# Patient Record
Sex: FEMALE | Race: WHITE | NOT HISPANIC OR LATINO
[De-identification: names, ages, dates, MRNs, and addresses within clinical notes are randomized per-mention and may not be internally consistent; named-entity substitution may affect disease eponyms.]

---

## 2017-02-08 ENCOUNTER — APPOINTMENT (OUTPATIENT)
Dept: CARDIOLOGY | Facility: CLINIC | Age: 82
End: 2017-02-08

## 2017-02-08 VITALS
BODY MASS INDEX: 25.39 KG/M2 | TEMPERATURE: 97 F | HEIGHT: 66 IN | WEIGHT: 158 LBS | SYSTOLIC BLOOD PRESSURE: 120 MMHG | OXYGEN SATURATION: 98 % | DIASTOLIC BLOOD PRESSURE: 70 MMHG | HEART RATE: 66 BPM | RESPIRATION RATE: 12 BRPM

## 2017-05-31 ENCOUNTER — APPOINTMENT (OUTPATIENT)
Dept: CARDIOLOGY | Facility: CLINIC | Age: 82
End: 2017-05-31

## 2017-05-31 VITALS
DIASTOLIC BLOOD PRESSURE: 78 MMHG | RESPIRATION RATE: 12 BRPM | SYSTOLIC BLOOD PRESSURE: 130 MMHG | TEMPERATURE: 97 F | HEIGHT: 66 IN | OXYGEN SATURATION: 98 % | WEIGHT: 160 LBS | BODY MASS INDEX: 25.71 KG/M2 | HEART RATE: 65 BPM

## 2017-08-09 ENCOUNTER — MEDICATION RENEWAL (OUTPATIENT)
Age: 82
End: 2017-08-09

## 2017-08-09 ENCOUNTER — APPOINTMENT (OUTPATIENT)
Dept: CARDIOLOGY | Facility: CLINIC | Age: 82
End: 2017-08-09
Payer: MEDICARE

## 2017-08-09 VITALS
SYSTOLIC BLOOD PRESSURE: 130 MMHG | HEIGHT: 66 IN | RESPIRATION RATE: 12 BRPM | WEIGHT: 157 LBS | HEART RATE: 90 BPM | BODY MASS INDEX: 25.23 KG/M2 | DIASTOLIC BLOOD PRESSURE: 70 MMHG

## 2017-08-09 PROCEDURE — 93280 PM DEVICE PROGR EVAL DUAL: CPT

## 2017-08-09 PROCEDURE — 99215 OFFICE O/P EST HI 40 MIN: CPT | Mod: 25

## 2017-11-15 ENCOUNTER — APPOINTMENT (OUTPATIENT)
Dept: CARDIOLOGY | Facility: CLINIC | Age: 82
End: 2017-11-15
Payer: MEDICARE

## 2017-11-15 VITALS
HEART RATE: 75 BPM | RESPIRATION RATE: 14 BRPM | BODY MASS INDEX: 25.39 KG/M2 | DIASTOLIC BLOOD PRESSURE: 70 MMHG | SYSTOLIC BLOOD PRESSURE: 140 MMHG | WEIGHT: 158 LBS | HEIGHT: 66 IN

## 2017-11-15 VITALS — DIASTOLIC BLOOD PRESSURE: 70 MMHG | SYSTOLIC BLOOD PRESSURE: 130 MMHG

## 2017-11-15 PROCEDURE — 99215 OFFICE O/P EST HI 40 MIN: CPT | Mod: 25

## 2017-11-15 PROCEDURE — 93000 ELECTROCARDIOGRAM COMPLETE: CPT

## 2018-04-04 ENCOUNTER — APPOINTMENT (OUTPATIENT)
Dept: CARDIOLOGY | Facility: CLINIC | Age: 83
End: 2018-04-04
Payer: MEDICARE

## 2018-04-04 VITALS
WEIGHT: 157 LBS | HEART RATE: 70 BPM | SYSTOLIC BLOOD PRESSURE: 132 MMHG | BODY MASS INDEX: 25.23 KG/M2 | HEIGHT: 66 IN | DIASTOLIC BLOOD PRESSURE: 80 MMHG | RESPIRATION RATE: 14 BRPM

## 2018-04-04 PROCEDURE — 93281 PM DEVICE PROGR EVAL MULTI: CPT

## 2018-04-04 PROCEDURE — 99215 OFFICE O/P EST HI 40 MIN: CPT | Mod: 25

## 2019-05-30 ENCOUNTER — OTHER (OUTPATIENT)
Age: 84
End: 2019-05-30

## 2019-05-30 ENCOUNTER — RX RENEWAL (OUTPATIENT)
Age: 84
End: 2019-05-30

## 2019-05-30 ENCOUNTER — RESULT CHARGE (OUTPATIENT)
Age: 84
End: 2019-05-30

## 2019-06-18 ENCOUNTER — RX RENEWAL (OUTPATIENT)
Age: 84
End: 2019-06-18

## 2019-10-12 ENCOUNTER — INPATIENT (INPATIENT)
Facility: HOSPITAL | Age: 84
LOS: 1 days | Discharge: HOME HEALTH | DRG: 392 | End: 2019-10-14
Attending: HOSPITALIST | Admitting: HOSPITALIST
Payer: MEDICARE

## 2019-10-12 ENCOUNTER — TRANSCRIPTION ENCOUNTER (OUTPATIENT)
Age: 84
End: 2019-10-12

## 2019-10-12 VITALS
DIASTOLIC BLOOD PRESSURE: 64 MMHG | SYSTOLIC BLOOD PRESSURE: 137 MMHG | HEART RATE: 66 BPM | OXYGEN SATURATION: 98 % | RESPIRATION RATE: 17 BRPM

## 2019-10-12 DIAGNOSIS — E11.9 TYPE 2 DIABETES MELLITUS WITHOUT COMPLICATIONS: ICD-10-CM

## 2019-10-12 DIAGNOSIS — R10.13 EPIGASTRIC PAIN: ICD-10-CM

## 2019-10-12 DIAGNOSIS — H40.9 UNSPECIFIED GLAUCOMA: ICD-10-CM

## 2019-10-12 DIAGNOSIS — R07.89 OTHER CHEST PAIN: ICD-10-CM

## 2019-10-12 DIAGNOSIS — I48.91 UNSPECIFIED ATRIAL FIBRILLATION: ICD-10-CM

## 2019-10-12 LAB
ALBUMIN SERPL ELPH-MCNC: 3.9 G/DL — SIGNIFICANT CHANGE UP (ref 3.3–5)
ALBUMIN SERPL ELPH-MCNC: 4.1 G/DL — SIGNIFICANT CHANGE UP (ref 3.3–5)
ALP SERPL-CCNC: 44 U/L — SIGNIFICANT CHANGE UP (ref 40–120)
ALP SERPL-CCNC: 50 U/L — SIGNIFICANT CHANGE UP (ref 40–120)
ALT FLD-CCNC: 10 U/L — SIGNIFICANT CHANGE UP (ref 10–45)
ALT FLD-CCNC: 13 U/L — SIGNIFICANT CHANGE UP (ref 10–45)
ANION GAP SERPL CALC-SCNC: 12 MMOL/L — SIGNIFICANT CHANGE UP (ref 5–17)
ANION GAP SERPL CALC-SCNC: 9 MMOL/L — SIGNIFICANT CHANGE UP (ref 5–17)
APTT BLD: 42.2 SEC — HIGH (ref 27.5–36.3)
APTT BLD: 42.9 SEC — HIGH (ref 27.5–36.3)
AST SERPL-CCNC: 18 U/L — SIGNIFICANT CHANGE UP (ref 10–40)
AST SERPL-CCNC: 21 U/L — SIGNIFICANT CHANGE UP (ref 10–40)
BILIRUB SERPL-MCNC: 0.6 MG/DL — SIGNIFICANT CHANGE UP (ref 0.2–1.2)
BILIRUB SERPL-MCNC: 0.7 MG/DL — SIGNIFICANT CHANGE UP (ref 0.2–1.2)
BUN SERPL-MCNC: 9 MG/DL — SIGNIFICANT CHANGE UP (ref 7–23)
BUN SERPL-MCNC: 9 MG/DL — SIGNIFICANT CHANGE UP (ref 7–23)
CALCIUM SERPL-MCNC: 9 MG/DL — SIGNIFICANT CHANGE UP (ref 8.4–10.5)
CALCIUM SERPL-MCNC: 9.4 MG/DL — SIGNIFICANT CHANGE UP (ref 8.4–10.5)
CHLORIDE SERPL-SCNC: 101 MMOL/L — SIGNIFICANT CHANGE UP (ref 96–108)
CHLORIDE SERPL-SCNC: 101 MMOL/L — SIGNIFICANT CHANGE UP (ref 96–108)
CHOLEST SERPL-MCNC: 153 MG/DL — SIGNIFICANT CHANGE UP (ref 10–199)
CK MB CFR SERPL CALC: 2.9 NG/ML — SIGNIFICANT CHANGE UP (ref 0–6.7)
CK MB CFR SERPL CALC: 3.4 NG/ML — SIGNIFICANT CHANGE UP (ref 0–6.7)
CK SERPL-CCNC: 68 U/L — SIGNIFICANT CHANGE UP (ref 25–170)
CK SERPL-CCNC: 77 U/L — SIGNIFICANT CHANGE UP (ref 25–170)
CO2 SERPL-SCNC: 26 MMOL/L — SIGNIFICANT CHANGE UP (ref 22–31)
CO2 SERPL-SCNC: 27 MMOL/L — SIGNIFICANT CHANGE UP (ref 22–31)
CREAT SERPL-MCNC: 0.43 MG/DL — LOW (ref 0.5–1.3)
CREAT SERPL-MCNC: 0.47 MG/DL — LOW (ref 0.5–1.3)
GLUCOSE SERPL-MCNC: 144 MG/DL — HIGH (ref 70–99)
GLUCOSE SERPL-MCNC: 167 MG/DL — HIGH (ref 70–99)
HBA1C BLD-MCNC: 6.3 % — HIGH (ref 4–5.6)
HCT VFR BLD CALC: 37 % — SIGNIFICANT CHANGE UP (ref 34.5–45)
HCT VFR BLD CALC: 39.7 % — SIGNIFICANT CHANGE UP (ref 34.5–45)
HDLC SERPL-MCNC: 81 MG/DL — SIGNIFICANT CHANGE UP
HGB BLD-MCNC: 12.3 G/DL — SIGNIFICANT CHANGE UP (ref 11.5–15.5)
HGB BLD-MCNC: 13 G/DL — SIGNIFICANT CHANGE UP (ref 11.5–15.5)
INR BLD: 3.94 — HIGH (ref 0.88–1.16)
INR BLD: 4.11 — HIGH (ref 0.88–1.16)
LIPID PNL WITH DIRECT LDL SERPL: 61 MG/DL — SIGNIFICANT CHANGE UP
MAGNESIUM SERPL-MCNC: 1.8 MG/DL — SIGNIFICANT CHANGE UP (ref 1.6–2.6)
MCHC RBC-ENTMCNC: 31 PG — SIGNIFICANT CHANGE UP (ref 27–34)
MCHC RBC-ENTMCNC: 31.3 PG — SIGNIFICANT CHANGE UP (ref 27–34)
MCHC RBC-ENTMCNC: 32.7 GM/DL — SIGNIFICANT CHANGE UP (ref 32–36)
MCHC RBC-ENTMCNC: 33.2 GM/DL — SIGNIFICANT CHANGE UP (ref 32–36)
MCV RBC AUTO: 94.1 FL — SIGNIFICANT CHANGE UP (ref 80–100)
MCV RBC AUTO: 94.7 FL — SIGNIFICANT CHANGE UP (ref 80–100)
NRBC # BLD: 0 /100 WBCS — SIGNIFICANT CHANGE UP (ref 0–0)
NRBC # BLD: 0 /100 WBCS — SIGNIFICANT CHANGE UP (ref 0–0)
PHOSPHATE SERPL-MCNC: 2.9 MG/DL — SIGNIFICANT CHANGE UP (ref 2.5–4.5)
PLATELET # BLD AUTO: 159 K/UL — SIGNIFICANT CHANGE UP (ref 150–400)
PLATELET # BLD AUTO: 165 K/UL — SIGNIFICANT CHANGE UP (ref 150–400)
POTASSIUM SERPL-MCNC: 3.8 MMOL/L — SIGNIFICANT CHANGE UP (ref 3.5–5.3)
POTASSIUM SERPL-MCNC: 4.3 MMOL/L — SIGNIFICANT CHANGE UP (ref 3.5–5.3)
POTASSIUM SERPL-SCNC: 3.8 MMOL/L — SIGNIFICANT CHANGE UP (ref 3.5–5.3)
POTASSIUM SERPL-SCNC: 4.3 MMOL/L — SIGNIFICANT CHANGE UP (ref 3.5–5.3)
PROT SERPL-MCNC: 6.5 G/DL — SIGNIFICANT CHANGE UP (ref 6–8.3)
PROT SERPL-MCNC: 7.3 G/DL — SIGNIFICANT CHANGE UP (ref 6–8.3)
PROTHROM AB SERPL-ACNC: 46.5 SEC — HIGH (ref 10–12.9)
PROTHROM AB SERPL-ACNC: 48.5 SEC — HIGH (ref 10–12.9)
RAPID RVP RESULT: SIGNIFICANT CHANGE UP
RBC # BLD: 3.93 M/UL — SIGNIFICANT CHANGE UP (ref 3.8–5.2)
RBC # BLD: 4.19 M/UL — SIGNIFICANT CHANGE UP (ref 3.8–5.2)
RBC # FLD: 12 % — SIGNIFICANT CHANGE UP (ref 10.3–14.5)
RBC # FLD: 12.1 % — SIGNIFICANT CHANGE UP (ref 10.3–14.5)
SODIUM SERPL-SCNC: 137 MMOL/L — SIGNIFICANT CHANGE UP (ref 135–145)
SODIUM SERPL-SCNC: 139 MMOL/L — SIGNIFICANT CHANGE UP (ref 135–145)
TOTAL CHOLESTEROL/HDL RATIO MEASUREMENT: 1.9 RATIO — LOW (ref 3.3–7.1)
TRIGL SERPL-MCNC: 56 MG/DL — SIGNIFICANT CHANGE UP (ref 10–149)
TROPONIN T SERPL-MCNC: <0.01 NG/ML — SIGNIFICANT CHANGE UP (ref 0–0.01)
TROPONIN T SERPL-MCNC: <0.01 NG/ML — SIGNIFICANT CHANGE UP (ref 0–0.01)
WBC # BLD: 6.88 K/UL — SIGNIFICANT CHANGE UP (ref 3.8–10.5)
WBC # BLD: 7.5 K/UL — SIGNIFICANT CHANGE UP (ref 3.8–10.5)
WBC # FLD AUTO: 6.88 K/UL — SIGNIFICANT CHANGE UP (ref 3.8–10.5)
WBC # FLD AUTO: 7.5 K/UL — SIGNIFICANT CHANGE UP (ref 3.8–10.5)

## 2019-10-12 PROCEDURE — 99222 1ST HOSP IP/OBS MODERATE 55: CPT

## 2019-10-12 PROCEDURE — 93308 TTE F-UP OR LMTD: CPT | Mod: 26,59

## 2019-10-12 PROCEDURE — 99291 CRITICAL CARE FIRST HOUR: CPT

## 2019-10-12 PROCEDURE — 93010 ELECTROCARDIOGRAM REPORT: CPT

## 2019-10-12 PROCEDURE — 93306 TTE W/DOPPLER COMPLETE: CPT | Mod: 26

## 2019-10-12 PROCEDURE — 93010 ELECTROCARDIOGRAM REPORT: CPT | Mod: 77

## 2019-10-12 PROCEDURE — 71045 X-RAY EXAM CHEST 1 VIEW: CPT | Mod: 26

## 2019-10-12 RX ORDER — POTASSIUM CHLORIDE 20 MEQ
20 PACKET (EA) ORAL ONCE
Refills: 0 | Status: COMPLETED | OUTPATIENT
Start: 2019-10-12 | End: 2019-10-12

## 2019-10-12 RX ORDER — PANTOPRAZOLE SODIUM 20 MG/1
40 TABLET, DELAYED RELEASE ORAL
Refills: 0 | Status: DISCONTINUED | OUTPATIENT
Start: 2019-10-12 | End: 2019-10-14

## 2019-10-12 RX ORDER — ENOXAPARIN SODIUM 100 MG/ML
40 INJECTION SUBCUTANEOUS EVERY 24 HOURS
Refills: 0 | Status: DISCONTINUED | OUTPATIENT
Start: 2019-10-12 | End: 2019-10-12

## 2019-10-12 RX ORDER — INSULIN LISPRO 100/ML
VIAL (ML) SUBCUTANEOUS
Refills: 0 | Status: DISCONTINUED | OUTPATIENT
Start: 2019-10-12 | End: 2019-10-14

## 2019-10-12 RX ORDER — DEXTROSE 50 % IN WATER 50 %
12.5 SYRINGE (ML) INTRAVENOUS ONCE
Refills: 0 | Status: DISCONTINUED | OUTPATIENT
Start: 2019-10-12 | End: 2019-10-14

## 2019-10-12 RX ORDER — DEXTROSE 50 % IN WATER 50 %
25 SYRINGE (ML) INTRAVENOUS ONCE
Refills: 0 | Status: DISCONTINUED | OUTPATIENT
Start: 2019-10-12 | End: 2019-10-14

## 2019-10-12 RX ORDER — MAGNESIUM SULFATE 500 MG/ML
1 VIAL (ML) INJECTION ONCE
Refills: 0 | Status: COMPLETED | OUTPATIENT
Start: 2019-10-12 | End: 2019-10-12

## 2019-10-12 RX ORDER — INFLUENZA VIRUS VACCINE 15; 15; 15; 15 UG/.5ML; UG/.5ML; UG/.5ML; UG/.5ML
0.5 SUSPENSION INTRAMUSCULAR ONCE
Refills: 0 | Status: DISCONTINUED | OUTPATIENT
Start: 2019-10-12 | End: 2019-10-14

## 2019-10-12 RX ORDER — SODIUM CHLORIDE 9 MG/ML
1000 INJECTION, SOLUTION INTRAVENOUS
Refills: 0 | Status: DISCONTINUED | OUTPATIENT
Start: 2019-10-12 | End: 2019-10-14

## 2019-10-12 RX ORDER — GLUCAGON INJECTION, SOLUTION 0.5 MG/.1ML
1 INJECTION, SOLUTION SUBCUTANEOUS ONCE
Refills: 0 | Status: DISCONTINUED | OUTPATIENT
Start: 2019-10-12 | End: 2019-10-14

## 2019-10-12 RX ORDER — DIGOXIN 250 MCG
0.12 TABLET ORAL EVERY 24 HOURS
Refills: 0 | Status: DISCONTINUED | OUTPATIENT
Start: 2019-10-12 | End: 2019-10-14

## 2019-10-12 RX ORDER — DILTIAZEM HCL 120 MG
180 CAPSULE, EXT RELEASE 24 HR ORAL EVERY 24 HOURS
Refills: 0 | Status: DISCONTINUED | OUTPATIENT
Start: 2019-10-12 | End: 2019-10-14

## 2019-10-12 RX ORDER — DORZOLAMIDE HYDROCHLORIDE 20 MG/ML
1 SOLUTION/ DROPS OPHTHALMIC
Refills: 0 | Status: DISCONTINUED | OUTPATIENT
Start: 2019-10-12 | End: 2019-10-14

## 2019-10-12 RX ORDER — ACETAMINOPHEN 500 MG
650 TABLET ORAL ONCE
Refills: 0 | Status: COMPLETED | OUTPATIENT
Start: 2019-10-12 | End: 2019-10-12

## 2019-10-12 RX ORDER — CHLORHEXIDINE GLUCONATE 213 G/1000ML
1 SOLUTION TOPICAL
Refills: 0 | Status: DISCONTINUED | OUTPATIENT
Start: 2019-10-12 | End: 2019-10-12

## 2019-10-12 RX ORDER — DEXTROSE 50 % IN WATER 50 %
15 SYRINGE (ML) INTRAVENOUS ONCE
Refills: 0 | Status: DISCONTINUED | OUTPATIENT
Start: 2019-10-12 | End: 2019-10-14

## 2019-10-12 RX ADMIN — PANTOPRAZOLE SODIUM 40 MILLIGRAM(S): 20 TABLET, DELAYED RELEASE ORAL at 12:35

## 2019-10-12 RX ADMIN — ENOXAPARIN SODIUM 40 MILLIGRAM(S): 100 INJECTION SUBCUTANEOUS at 06:51

## 2019-10-12 RX ADMIN — Medication 20 MILLIEQUIVALENT(S): at 05:35

## 2019-10-12 RX ADMIN — DORZOLAMIDE HYDROCHLORIDE 1 DROP(S): 20 SOLUTION/ DROPS OPHTHALMIC at 20:28

## 2019-10-12 RX ADMIN — Medication 100 GRAM(S): at 04:30

## 2019-10-12 RX ADMIN — Medication 0.12 MILLIGRAM(S): at 06:51

## 2019-10-12 RX ADMIN — CHLORHEXIDINE GLUCONATE 1 APPLICATION(S): 213 SOLUTION TOPICAL at 06:48

## 2019-10-12 RX ADMIN — Medication 180 MILLIGRAM(S): at 07:03

## 2019-10-12 NOTE — DISCHARGE NOTE PROVIDER - NSDCFUADDAPPT_GEN_ALL_CORE_FT
Please follow up with  Please follow up with Dr. Tavarez in 1 week. Please call her office and make an appointment to see her. It is very important that you follow up with Dr. Tavarez in 1week to have your INR checked  Please follow up with your Primary care provider in 1-2 week.

## 2019-10-12 NOTE — H&P ADULT - NSHPLABSRESULTS_GEN_ALL_CORE
LABS:                        13.0   7.50  )-----------( 165      ( 12 Oct 2019 01:53 )             39.7     10-12    139  |  101  |  9   ----------------------------<  167<H>  3.8   |  26  |  0.43<L>    Ca    9.4      12 Oct 2019 01:53  Phos  2.9     10-12  Mg     1.8     10-12    TPro  7.3  /  Alb  4.1  /  TBili  0.7  /  DBili  x   /  AST  21  /  ALT  13  /  AlkPhos  50  10-12

## 2019-10-12 NOTE — PROGRESS NOTE ADULT - PROBLEM SELECTOR PLAN 2
epigastric pain assoc w/NBNB vomiting and diarrhea that has now resolved.   - remains afebrile w/ no leukocytosis.  RVP negative, CXR clear.   - Abd exam benign  - simethicone prn for bloating

## 2019-10-12 NOTE — DISCHARGE NOTE PROVIDER - PROVIDER TOKENS
FREE:[LAST:[[facility]?],PHONE:[(   )    -],FAX:[(   )    -]] FREE:[LAST:[Dr. whitley],FIRST:[alma],PHONE:[phone]],FAX:[(   )    -],ADDRESS:[[place] [address]]]

## 2019-10-12 NOTE — PROGRESS NOTE ADULT - SUBJECTIVE AND OBJECTIVE BOX
Interventional Cardiology PA Adult Progress Note/CCU STEPDOWN NOTE    Hospital Course:     86-year-old female  history of spinal stenosis, hyperlipidemia, [facility] PPM, chronic afib (on coumadin), diet controlled diabetes mellitus who is transferred to [facility] from OSH 10/12 for epigastric pain and discomfort; nonradiating epigastric 9/10 pain assoc with NBNB vomiting x4, diarrhea x2; admitted to CCU for monitoring and workup. Symptoms resolved once in the CCU.  Trops negative x2, EKG not concerning for acute pathology, no longer having abd pain.  For infectious workup - WBC wnl and patient afebrile while in CCU with no urinary symptoms. AM CXR clear and RVP negative. Pt endorsed some dizziness but was orthostatics negative.  Patient is pending a PPM interrogation.  While in the CCU patient remained hemodynamically stable and is up for transfer to [facility] to 5U for any further work up and monitoring.    Subjective Assessment:  	  MEDICATIONS:  digoxin     Tablet 0.125 milliGRAM(s) Oral every 24 hours  diltiazem    milliGRAM(s) Oral every 24 hours  pantoprazole    Tablet 40 milliGRAM(s) Oral before breakfast  insulin lispro (HumaLOG) corrective regimen sliding scale   SubCutaneous three times a day before meals  dextrose 5%. 1000 milliLiter(s) IV Continuous <Continuous>  dorzolamide 2% Ophthalmic Solution 1 Drop(s) Right EYE <User Schedule>  influenza   Vaccine 0.5 milliLiter(s) IntraMuscular once	    [PHYSICAL EXAM:  TELEMETRY:  T(C): 36.9 (10-12-19 @ 20:23), Max: 36.9 (10-12-19 @ 06:36)  HR: 64 (10-12-19 @ 20:00) (62 - 74)  BP: 112/72 (10-12-19 @ 20:00) (100/64 - 157/72)  RR: 26 (10-12-19 @ 20:00) (14 - 36)  SpO2: 99% (10-12-19 @ 20:00) (97% - 100%)  Wt(kg): --  I&O's Summary    11 Oct 2019 07:01  -  12 Oct 2019 07:00  --------------------------------------------------------  IN: 0 mL / OUT: 150 mL / NET: -150 mL    12 Oct 2019 07:01  -  12 Oct 2019 20:56  --------------------------------------------------------  IN: 600 mL / OUT: 800 mL / NET: -200 mL      Height (cm): 167.6 (10-12 @ 03:06)  Weight (kg): 119.5 (10-12 @ 03:02)  BMI (kg/m2): 42.5 (10-12 @ 03:06)  BSA (m2): 2.25 (10-12 @ 03:06)                                      Appearance: Normal	  HEENT:   Normal oral mucosa, PERRL, EOMI	  Neck: Supple, + JVD/ - JVD; Carotid Bruit   Cardiovascular: Normal S1 S2, No JVD, No murmurs,   Respiratory: Lungs clear to auscultation/Decreased Breath Sounds/No Rales, Rhonchi, Wheezing	  Gastrointestinal:  Soft, Non-tender, + BS	  Skin: No rashes, No ecchymoses, No cyanosis  Extremities: Normal range of motion, No clubbing, cyanosis or edema  Vascular: Peripheral pulses palpable 2+ bilaterally  Neurologic: Non-focal  Psychiatry: A & O x 3, Mood & affect appropriate   	    LABS:	 	  CARDIAC MARKERS                          12.3   6.88  )-----------( 159      ( 12 Oct 2019 06:14 )             37.0     10-12    137  |  101  |  9   ----------------------------<  144<H>  4.3   |  27  |  0.47<L>    Ca    9.0      12 Oct 2019 06:14  Phos  2.9     10-12  Mg     1.8     10-12    TPro  6.5  /  Alb  3.9  /  TBili  0.6  /  DBili  x   /  AST  18  /  ALT  10  /  AlkPhos  44  10-12    proBNP:   Lipid Profile:   HgA1c: Hemoglobin A1C, Whole Blood: 6.3 % (10-12 @ 06:14)    TSH: Thyroid Stimulating Hormone, Serum: 0.555 uIU/mL (10-12 @ 01:53)    PT/INR - ( 12 Oct 2019 06:14 )   PT: 46.5 sec;   INR: 3.94          PTT - ( 12 Oct 2019 06:14 )  PTT:42.9 sec    ASSESSMENT/PLAN: 	        DVT ppx:  Dispo: Interventional Cardiology PA Adult Progress Note/CCU STEPDOWN NOTE    Hospital Course:     86-year-old female  history of spinal stenosis, hyperlipidemia, [facility] PPM, chronic afib (on coumadin), diet controlled diabetes mellitus who is transferred to [facility] from [facility] 10/12 for epigastric pain and discomfort; nonradiating epigastric 9/10 pain assoc with NBNB vomiting x4, diarrhea x2; admitted to CCU for monitoring and workup. Symptoms resolved once in the CCU.  Trops negative x2, EKG not concerning for acute pathology, no longer having abd pain.  For infectious workup - WBC wnl and patient afebrile while in CCU with no urinary symptoms. AM CXR clear and RVP negative. Pt endorsed some dizziness but was orthostatics negative.  INR supratherapeutic and holding home coumadin. Patient is pending a PPM interrogation.  While in the CCU patient remained hemodynamically stable and is up for transfer to [facility] floor to 5U for any further work up and monitoring.    Subjective Assessment:  	  MEDICATIONS:  digoxin     Tablet 0.125 milliGRAM(s) Oral every 24 hours  diltiazem    milliGRAM(s) Oral every 24 hours  pantoprazole    Tablet 40 milliGRAM(s) Oral before breakfast  insulin lispro (HumaLOG) corrective regimen sliding scale   SubCutaneous three times a day before meals  dextrose 5%. 1000 milliLiter(s) IV Continuous <Continuous>  dorzolamide 2% Ophthalmic Solution 1 Drop(s) Right EYE <User Schedule>  influenza   Vaccine 0.5 milliLiter(s) IntraMuscular once	    [PHYSICAL EXAM:  TELEMETRY:  T(C): 36.9 (10-12-19 @ 20:23), Max: 36.9 (10-12-19 @ 06:36)  HR: 64 (10-12-19 @ 20:00) (62 - 74)  BP: 112/72 (10-12-19 @ 20:00) (100/64 - 157/72)  RR: 26 (10-12-19 @ 20:00) (14 - 36)  SpO2: 99% (10-12-19 @ 20:00) (97% - 100%)  Wt(kg): --  I&O's Summary    11 Oct 2019 07:01  -  12 Oct 2019 07:00  --------------------------------------------------------  IN: 0 mL / OUT: 150 mL / NET: -150 mL    12 Oct 2019 07:01  -  12 Oct 2019 20:56  --------------------------------------------------------  IN: 600 mL / OUT: 800 mL / NET: -200 mL      Height (cm): 167.6 (10-12 @ 03:06)  Weight (kg): 119.5 (10-12 @ 03:02)  BMI (kg/m2): 42.5 (10-12 @ 03:06)  BSA (m2): 2.25 (10-12 @ 03:06)                                      Appearance: Normal	  HEENT:   Normal oral mucosa, PERRL, EOMI	  Neck: Supple, + JVD/ - JVD; Carotid Bruit   Cardiovascular: Normal S1 S2, No JVD, No murmurs,   Respiratory: Lungs clear to auscultation/Decreased Breath Sounds/No Rales, Rhonchi, Wheezing	  Gastrointestinal:  Soft, Non-tender, + BS	  Skin: No rashes, No ecchymoses, No cyanosis  Extremities: Normal range of motion, No clubbing, cyanosis or edema  Vascular: Peripheral pulses palpable 2+ bilaterally  Neurologic: Non-focal  Psychiatry: A & O x 3, Mood & affect appropriate   	    LABS:	 	  CARDIAC MARKERS                          12.3   6.88  )-----------( 159      ( 12 Oct 2019 06:14 )             37.0     10-12    137  |  101  |  9   ----------------------------<  144<H>  4.3   |  27  |  0.47<L>    Ca    9.0      12 Oct 2019 06:14  Phos  2.9     10-12  Mg     1.8     10-12    TPro  6.5  /  Alb  3.9  /  TBili  0.6  /  DBili  x   /  AST  18  /  ALT  10  /  AlkPhos  44  10-12    proBNP:   Lipid Profile:   HgA1c: Hemoglobin A1C, Whole Blood: 6.3 % (10-12 @ 06:14)    TSH: Thyroid Stimulating Hormone, Serum: 0.555 uIU/mL (10-12 @ 01:53)    PT/INR - ( 12 Oct 2019 06:14 )   PT: 46.5 sec;   INR: 3.94          PTT - ( 12 Oct 2019 06:14 )  PTT:42.9 sec    ASSESSMENT/PLAN: 	        DVT ppx:  Dispo: Interventional Cardiology PA Adult Progress Note/CCU STEPDOWN NOTE    Hospital Course:     86-year-old female  history of spinal stenosis, hyperlipidemia, [facility] PPM, chronic afib (on coumadin), diet controlled diabetes mellitus who is transferred to [facility] from [facility] 10/12 for epigastric pain and discomfort; nonradiating epigastric 9/10 pain assoc with NBNB vomiting x4, diarrhea x2; admitted to CCU for monitoring and workup. Symptoms resolved once in the CCU.  Trops negative x2, EKG not concerning for acute pathology, no longer having abd pain.  For infectious workup - WBC wnl and patient afebrile while in CCU with no urinary symptoms. AM CXR clear and RVP negative. Pt endorsed some dizziness but was orthostatics negative.  INR supratherapeutic and holding home coumadin. Patient is pending a PPM interrogation.  While in the CCU patient remained hemodynamically stable and is up for transfer to tele floor to 5U for any further work up and monitoring.    Subjective Assessment: Patient see and examined at bedside, she is comfortable now, states her abdominal pain has resolved and she will see how she feels with food  ROS negtive except per HPI and subjective  	  MEDICATIONS:  digoxin     Tablet 0.125 milliGRAM(s) Oral every 24 hours  diltiazem    milliGRAM(s) Oral every 24 hours  pantoprazole    Tablet 40 milliGRAM(s) Oral before breakfast  insulin lispro (HumaLOG) corrective regimen sliding scale   SubCutaneous three times a day before meals  dextrose 5%. 1000 milliLiter(s) IV Continuous <Continuous>  dorzolamide 2% Ophthalmic Solution 1 Drop(s) Right EYE <User Schedule>  influenza   Vaccine 0.5 milliLiter(s) IntraMuscular once	    [PHYSICAL EXAM:  TELEMETRY:  T(C): 36.9 (10-12-19 @ 20:23), Max: 36.9 (10-12-19 @ 06:36)  HR: 64 (10-12-19 @ 20:00) (62 - 74)  BP: 112/72 (10-12-19 @ 20:00) (100/64 - 157/72)  RR: 26 (10-12-19 @ 20:00) (14 - 36)  SpO2: 99% (10-12-19 @ 20:00) (97% - 100%)  Wt(kg): --  I&O's Summary    11 Oct 2019 07:01  -  12 Oct 2019 07:00  --------------------------------------------------------  IN: 0 mL / OUT: 150 mL / NET: -150 mL    12 Oct 2019 07:01  -  12 Oct 2019 20:56  --------------------------------------------------------  IN: 600 mL / OUT: 800 mL / NET: -200 mL      Height (cm): 167.6 (10-12 @ 03:06)  Weight (kg): 119.5 (10-12 @ 03:02)  BMI (kg/m2): 42.5 (10-12 @ 03:06)  BSA (m2): 2.25 (10-12 @ 03:06)                                      Appearance: Normal	  HEENT:   Normal oral mucosa, PERRL, EOMI	  Neck: Supple,  - JVD; Carotid Bruit   Cardiovascular: Normal S1 S2, No JVD, No murmurs,   Respiratory: Lungs clear to auscultation//No Rales, Rhonchi, Wheezing	  Gastrointestinal:  Soft, Non-tender, + BS	  Skin: No rashes, No ecchymoses, No cyanosis  Extremities: Normal range of motion, No clubbing, cyanosis or edema  Vascular: Peripheral pulses palpable 2+ bilaterally  Neurologic: Non-focal  Psychiatry: A & O x 3, Mood & affect appropriate   	    LABS:	 	  CARDIAC MARKERS                          12.3   6.88  )-----------( 159      ( 12 Oct 2019 06:14 )             37.0     10-12    137  |  101  |  9   ----------------------------<  144<H>  4.3   |  27  |  0.47<L>    Ca    9.0      12 Oct 2019 06:14  Phos  2.9     10-12  Mg     1.8     10-12    TPro  6.5  /  Alb  3.9  /  TBili  0.6  /  DBili  x   /  AST  18  /  ALT  10  /  AlkPhos  44  10-12    proBNP:   Lipid Profile:   HgA1c: Hemoglobin A1C, Whole Blood: 6.3 % (10-12 @ 06:14)    TSH: Thyroid Stimulating Hormone, Serum: 0.555 uIU/mL (10-12 @ 01:53)    PT/INR - ( 12 Oct 2019 06:14 )   PT: 46.5 sec;   INR: 3.94          PTT - ( 12 Oct 2019 06:14 )  PTT:42.9 sec    ASSESSMENT/PLAN: 	        DVT ppx:  Dispo:

## 2019-10-12 NOTE — DISCHARGE NOTE PROVIDER - NSDCCPCAREPLAN_GEN_ALL_CORE_FT
PRINCIPAL DISCHARGE DIAGNOSIS  Diagnosis: Gastroenteritis  Assessment and Plan of Treatment: Your abdominal pain, vomiting, and diarrhea were most likely due to gastroenteritis. This is a transient infection of your digestive tract, usually due to a virus or food poisoning. This condition usually resolves with 48 hours. While you were in the hospital, your blood pressures and temperatures were monitored. It is important to stay hydrated as you leave the hospital. Dehydration is the likely cause of your dizziness. If your abdominal pain worsens or you become febrile, it is important to return to the emergency department for evaluation.      SECONDARY DISCHARGE DIAGNOSES  Diagnosis: Diabetes mellitus  Assessment and Plan of Treatment: You have a diagnosis of type 2 diabetes (sometimes called type 2 "diabetes mellitus"). This is a disorder that disrupts the way your body uses sugar. All the cells in your body need sugar to work normally. Sugar gets into the cells with the help of a hormone called insulin. If there is not enough insulin, or if the body stops responding to insulin, sugar builds up in the blood. That is what happens to people with diabetes. Type 2 diabetes usually causes no symptoms. When symptoms do occur, they include the need to urinate often, intense thirst and blurry vision. Even though type 2 diabetes might not make you feel sick, it can cause serious problems over time, if it is not treated. The disorder can lead to heart attacks, strokes, kidney disease, vision problems (or even blindness), pain or loss of feeling in the hands and feet, and the need to have fingers, toes, or other body parts removed (amputated). In addition to maintaining an active lifestyle, losing weight, eating right, and not smoking it is important to take your diabetes medications as directed to control your blood sugar and prevent the possible complications from this disease.  It is important to follow up with your primary care provider regarding your A1C levels. Continue to eat low carb foods without added sugar and avoid sugary drinks, such as fruit juices and soft drinks.       Diagnosis: Afib  Assessment and Plan of Treatment: During this hospital admission you were found to be in Atrial Fibrillation. This is an irregular, often rapid heart rate that commonly causes poor blood flow. The heart's upper chambers (atria) beat out of coordination with the lower chambers (ventricles). This condition may have no symptoms, but when symptoms do appear they include palpitations, shortness of breath, and fatigue. Treatments include drugs, electrical shock (cardioversion), and minimally invasive surgery (ablation). Continue to take your blood thinner and rate controlling medications as directed. Please continue to take your medications as prescribed and follow up with your primary care doctor and cardiologist in 10-14 days.  You take a medication called warfarin for you afib. It is important to monitor your INR levels with your cardiologist. While you were in the hospital, your INR was higher than the therapeutic range for your condition. Your warfarin dose was held on 10/12. Please plan a follow up appointment for this condition.       Diagnosis: HLD (hyperlipidemia)  Assessment and Plan of Treatment: You have high cholesterol. Cholesterol is a substance that is found in the blood. Everyone has some. It is needed for good health. The problem is, people sometimes have too much cholesterol. Compared with people with normal cholesterol, people with high cholesterol have a higher risk of heart attacks, strokes, and other health problems. The higher your cholesterol, the higher your risk of these problems. It is important to take your medications for high cholesterol as prescribed to prevent the complications of this condition. PRINCIPAL DISCHARGE DIAGNOSIS  Diagnosis: Gastroenteritis  Assessment and Plan of Treatment: Your abdominal pain, vomiting, and diarrhea were most likely due to gastroenteritis. This is a transient infection of your digestive tract, usually due to a virus or food poisoning. We did a CT scan of your abdomen which revealed non-obstructing cholelithiasis not requiring any intervention. Please follow up with your primary care provider regularly.   If your abdominal pain worsens or you become febrile, it is important to return to the emergency department for evaluation.      SECONDARY DISCHARGE DIAGNOSES  Diagnosis: Tricuspid regurgitation  Assessment and Plan of Treatment: You had a ultrasound of your heart which revealed severe Tricuspid regurgitation    Diagnosis: BPPV (benign paroxysmal positional vertigo)  Assessment and Plan of Treatment: Your dizziness is most likely caused by BPPV which causes short episodes of intense dizziness (vertigo) when you move your head in certain directions. Vertigo is the sensation that you or your surroundings are moving. Benign paroxysmal positional vertigo is thought to be caused by tiny solid fragments (otoconia) in the inner ear labyrinth. In many cases the condition gets better on its own after several weeks. A simple treatment of moving the head into various positions over a few minutes can cure the condition in many cases. This treatment uses gravity to move the tiny fragments away from where they are causing problems.    Diagnosis: Diabetes mellitus  Assessment and Plan of Treatment: You have a diagnosis of type 2 diabetes (sometimes called type 2 "diabetes mellitus"). This is a disorder that disrupts the way your body uses sugar. All the cells in your body need sugar to work normally. Sugar gets into the cells with the help of a hormone called insulin. If there is not enough insulin, or if the body stops responding to insulin, sugar builds up in the blood. That is what happens to people with diabetes. Type 2 diabetes usually causes no symptoms. When symptoms do occur, they include the need to urinate often, intense thirst and blurry vision. Even though type 2 diabetes might not make you feel sick, it can cause serious problems over time, if it is not treated. The disorder can lead to heart attacks, strokes, kidney disease, vision problems (or even blindness), pain or loss of feeling in the hands and feet, and the need to have fingers, toes, or other body parts removed (amputated). In addition to maintaining an active lifestyle, losing weight, eating right, and not smoking it is important to take your diabetes medications as directed to control your blood sugar and prevent the possible complications from this disease.  It is important to follow up with your primary care provider regarding your A1C levels. Continue to eat low carb foods without added sugar and avoid sugary drinks, such as fruit juices and soft drinks.       Diagnosis: Afib  Assessment and Plan of Treatment: You have a jistory of Atrial Fibrillation. This is an irregular, often rapid heart rate that commonly causes poor blood flow. The heart's upper chambers (atria) beat out of coordination with the lower chambers (ventricles). This condition may have no symptoms, but when symptoms do appear they include palpitations, shortness of breath, and fatigue. Treatments include drugs, electrical shock (cardioversion), and minimally invasive surgery (ablation). Continue to take your blood thinner Coumadin daily . We have stopped your digoxin and started you on medication Toprol 25 mg daily for rate control; please also continue to take rate controlling agent Diltiazem 180 mg daily and follow up with your primary care doctor and cardiologist in 1-2 weeks.   You take a medication called coumadin  for you afib. It is important to monitor your INR levels with your cardiologist. While you were in the hospital, your INR was higher than the therapeutic range for your condition. YourINR is 1.9 today; please continue to take your warfarin and have routine INR checked with your PCP/Cardiologist. PRINCIPAL DISCHARGE DIAGNOSIS  Diagnosis: Gastroenteritis  Assessment and Plan of Treatment: Your abdominal pain, vomiting, and diarrhea were most likely due to gastroenteritis. This is a transient infection of your digestive tract, usually due to a virus or food poisoning. We did a CT scan of your abdomen which revealed non-obstructing cholelithiasis not requiring any intervention. Please follow up with your primary care provider regularly.   If your abdominal pain worsens or you become febrile, it is important to   return to the emergency department for evaluation.      SECONDARY DISCHARGE DIAGNOSES  Diagnosis: Tricuspid regurgitation  Assessment and Plan of Treatment: You had a ultrasound of your heart which revealed severe Tricuspid regurgitation which means that one of the valve inyour heart is leaky; please follow up with Dr. Whitley in 1 week for follw up.  If you have any severe Chest pain; shortness of breath; dizziness; nausea, vomitting; presyncope and syncopal events; please go to the nearest emergency room.    Diagnosis: BPPV (benign paroxysmal positional vertigo)  Assessment and Plan of Treatment: Your dizziness is most likely caused by BPPV which causes short episodes of intense dizziness (vertigo) when you move your head in certain directions. Vertigo is the sensation that you or your surroundings are moving. Benign paroxysmal positional vertigo is thought to be caused by tiny solid fragments (otoconia) in the inner ear labyrinth. In many cases the condition gets better on its own after several weeks. A simple treatment of moving the head into various positions over a few minutes can cure the condition in many cases. This treatment uses gravity to move the tiny fragments away from where they are causing problems.    Diagnosis: Diabetes mellitus  Assessment and Plan of Treatment: You have a diagnosis of type 2 diabetes (sometimes called type 2 "diabetes mellitus"). This is a disorder that disrupts the way your body uses sugar. All the cells in your body need sugar to work normally. Sugar gets into the cells with the help of a hormone called insulin. If there is not enough insulin, or if the body stops responding to insulin, sugar builds up in the blood. That is what happens to people with diabetes. Type 2 diabetes usually causes no symptoms. When symptoms do occur, they include the need to urinate often, intense thirst and blurry vision. Even though type 2 diabetes might not make you feel sick, it can cause serious problems over time, if it is not treated. The disorder can lead to heart attacks, strokes, kidney disease, vision problems (or even blindness), pain or loss of feeling in the hands and feet, and the need to have fingers, toes, or other body parts removed (amputated). In addition to maintaining an active lifestyle, losing weight, eating right, and not smoking it is important to take your diabetes medications as directed to control your blood sugar and prevent the possible complications from this disease.  It is important to follow up with your primary care provider regarding your A1C levels. Continue to eat low carb foods without added sugar and avoid sugary drinks, such as fruit juices and soft drinks.       Diagnosis: Afib  Assessment and Plan of Treatment: You have a jistory of Atrial Fibrillation. This is an irregular, often rapid heart rate that commonly causes poor blood flow. The heart's upper chambers (atria) beat out of coordination with the lower chambers (ventricles). This condition may have no symptoms, but when symptoms do appear they include palpitations, shortness of breath, and fatigue. Treatments include drugs, electrical shock (cardioversion), and minimally invasive surgery (ablation). Continue to take your blood thinner Coumadin daily . We have stopped your digoxin and started you on medication Toprol 25 mg daily for rate control; please also continue to take rate controlling agent Diltiazem 180 mg daily and follow up with your primary care doctor and cardiologist Dr. whitley in 1 week.   You take a medication called coumadin  for you afib. It is important to monitor your INR levels with your cardiologist. While you were in the hospital, your INR was higher than the therapeutic range for your condition. YourINR is 1.9 today; please continue to take your warfarin and have routine INR checked in 1 week with Dr. Whitley.  Please call her office and make an appointment to see her.

## 2019-10-12 NOTE — H&P ADULT - ASSESSMENT
NEURO  #    CARDIOVASCULAR  #    PULMONARY  #    GASTROINTESTINAL  #    RENAL  #    INFECTIOUS DISEASE  #    ENDOCRINE  #    HEME  #    FLUIDS/ELECTROLYTES/NUTRITION  -IVF  -Monitor, Replete to K>4 and Mg>2  -Diet  PROPHYLAXIS  -DVT  -GI    DISPO  CODE STATUS -rvp  -orthostatics    NEURO  #    CARDIOVASCULAR  #    PULMONARY  #    GASTROINTESTINAL  #    RENAL  #    INFECTIOUS DISEASE  #    ENDOCRINE  #    HEME  #    FLUIDS/ELECTROLYTES/NUTRITION  -IVF  -Monitor, Replete to K>4 and Mg>2  -Diet  PROPHYLAXIS  -DVT  -GI    DISPO  CODE STATUS NEURO  #JAMESON    CARDIOVASCULAR  #Chest discomfort - came from [facility], abd discomfort for 1 day after having some coffee with 4 episodes NBNB vomiting, watery diarrhea x2, no sick contacts, no fevers, endorses dizziness, also chronic dry cough at night for months and chest tightness for months; no known CAD (has Afib), pt endorses chronic dry cough, transferred to [facility] CCU for monitoring; EKG with new Twave inversions in lateral leads compared to 2018 EKG; trops negative x2, pt with symptoms resolving. Unlikely ischemic cause. No elevated WBC, no fever. Possibly gastroenteritis.  -f/u rvp  -trend troponins  -f/u AM CXR  -f/u orthostatics for dizziness  -consider device interrogation  -f/u bedside echo    #Afib - s/p pacemaker placed in 2016 by Dr. Moran ([phone]), family unsure exact reason of PM placement  -obtain collateral in AM  -cw digoxin 0.125mg daily  -cw diltiazem 180mg daily  -cw coumadin 5mg daily based on INR; titrate to goal INR 2-3    PULMONARY  #JAMESON  GASTROINTESTINAL  #As above  RENAL  #JAMESON  INFECTIOUS DISEASE - patient without elevated WBC count, no fevers or recent illnesses  #Gastroenteritis  - as above    ENDOCRINE  #DM - reported to have diabetes, not on any meds, only diet controlled  -f/u AM a1c  -ISS    HEME  #    FLUIDS/ELECTROLYTES/NUTRITION  -IVF  -Monitor, Replete to K>4 and Mg>2  -Diet  PROPHYLAXIS  -DVT  -GI    DISPO  CODE STATUS Ms. Cortez is an 86-year-old female history of [employer] PPM, chronic afib (on coumadin), diet controlled diabetes mellitus who presents acute epigastric pain associated with vomiting/diarrhea, chronic dry cough and chest tightness admitted to the CCU for monitoring now with resolution of symptoms, negative troponins, unconcerning bedside echo.    NEURO  #JAMESON    CARDIOVASCULAR  #Chest discomfort - came from [facility], abd discomfort for 1 day after having some coffee with 4 episodes NBNB vomiting, watery diarrhea x2, no sick contacts, no fevers, endorses dizziness, also chronic dry cough at night for months and chest tightness for months; no known CAD (has Afib), pt endorses chronic dry cough, transferred to [facility] CCU for monitoring; EKG with new Twave inversions in lateral leads compared to 2018 EKG; trops negative x2, pt with symptoms resolving. Unlikely ischemic cause. No elevated WBC, no fever. Possibly gastroenteritis. Bedside echo grossly normal.  -f/u rvp  -trend troponins  -f/u AM CXR  -f/u orthostatics for dizziness  -consider device interrogation  -f/u formal echo    #Afib - s/p pacemaker placed in 2016 by Dr. Moran ([phone]), family unsure exact reason of PM placement  -obtain collateral in AM  -cw digoxin 0.125mg daily  -cw diltiazem 180mg daily  -cw coumadin 5mg daily based on INR; titrate to goal INR 2-3    PULMONARY  #JAMESON  GASTROINTESTINAL  #Epigastric pain - Pt with epigastric pain assoc w/NBNB vomiting and diarrhea that has now resolved with decreased appetite. Afebrile with no WBC elevation and no sick contacts. Pain possibly 2/2 gastroenteritis that is resolving, abdomen benign on exam.   -As above    As above  RENAL  #JAMESON  INFECTIOUS DISEASE - patient without elevated WBC count, no fevers or recent illnesses  #Gastroenteritis  - as above    ENDOCRINE  #DM - reported to have diabetes, not on any meds, only diet controlled  -f/u AM a1c  -ISS    HEME  #    FLUIDS/ELECTROLYTES/NUTRITION  -IVF  -Monitor, Replete to K>4 and Mg>2  -Diet  PROPHYLAXIS  -DVT  -GI    DISPO  CODE STATUS

## 2019-10-12 NOTE — PROGRESS NOTE ADULT - PROBLEM SELECTOR PLAN 5
-cw eyedrops dorzolamide daily    VTE: INR supra therapeutic  Dispo: pending PPM interrogation, discuss any further work up w/ attending.

## 2019-10-12 NOTE — PROGRESS NOTE ADULT - PROBLEM SELECTOR PLAN 1
epigastric/Chest discomfort resolved. likely 2/2 Gastroenteritis  - EKG with new Twave inversions in lateral leads compared to 2018 EKG  - CE negative x2  - Echo grossly normal with EF 60%; unlikely ischemic cause.  -pending device interrogation

## 2019-10-12 NOTE — PROGRESS NOTE ADULT - SUBJECTIVE AND OBJECTIVE BOX
HOSPITAL COURSE:  Ms. Cortez is an 86-year-old female history of spinal stenosis, hyperlipidemia, [employer] PPM, chronic afib (on coumadin), diet controlled diabetes mellitus who presents for epigastric pain and discomfort; nonradiating epigastric 9/10 pain assoc with NBNB vomiting x4, diarrhea x2; admitted to CCU for monitoring and workup. Symptoms resolved once in the CCU. Trops negative x2, EKG not concerning for acute pathology, no longer having abd pain. For infectious workup - WBC wnl and patient afebrile while in CCU with no urinary symptoms. AM CXR clear and RVP negative. Pt endorsed some dizziness but was orthostatics negative. While in the CCU patient remained hemodynamically stable and is up for transfer to tele floor to 5U for further monitoring. Pt is pending PM interrogation tonight.    SUBJECTIVE: Pt feeling well with no complaints. Ate dinner, only feels slightly bloated, says it is releived with burping and passing gas.    Vital Signs Last 12 Hrs  T(F): 98.5 (10-12-19 @ 20:23), Max: 98.5 (10-12-19 @ 20:23)  HR: 64 (10-12-19 @ 20:00) (64 - 74)  BP: 112/72 (10-12-19 @ 20:00) (100/64 - 141/64)  BP(mean): 87 (10-12-19 @ 20:00) (72 - 100)  RR: 26 (10-12-19 @ 20:00) (14 - 36)  SpO2: 99% (10-12-19 @ 20:00) (97% - 99%)  I&O's Summary    11 Oct 2019 07:01  -  12 Oct 2019 07:00  --------------------------------------------------------  IN: 0 mL / OUT: 150 mL / NET: -150 mL    12 Oct 2019 07:01  -  12 Oct 2019 20:27  --------------------------------------------------------  IN: 600 mL / OUT: 800 mL / NET: -200 mL        PHYSICAL EXAM:  Constitutional: NAD, comfortable in bed  HEENT: NC/AT, EOMI, no conjunctival pallor or scleral icterus, MMM  Neck: Supple, no JVD, trachea midline  Respiratory: Normal rate, rhythm, depth, effort. CTAB. No w/r/r.   Cardiovascular: RRR, normal S1 and S2, no m/r/g.   Gastrointestinal: soft NTND, no guarding or rebound tenderness, no palpable masses   Extremities: wwp  Neurological: Alert, strength and sensation intact throughout grossly      LABS:                        12.3   6.88  )-----------( 159      ( 12 Oct 2019 06:14 )             37.0     10-12    137  |  101  |  9   ----------------------------<  144<H>  4.3   |  27  |  0.47<L>    Ca    9.0      12 Oct 2019 06:14  Phos  2.9     10-12  Mg     1.8     10-12    TPro  6.5  /  Alb  3.9  /  TBili  0.6  /  DBili  x   /  AST  18  /  ALT  10  /  AlkPhos  44  10-12    PT/INR - ( 12 Oct 2019 06:14 )   PT: 46.5 sec;   INR: 3.94          PTT - ( 12 Oct 2019 06:14 )  PTT:42.9 sec      RADIOLOGY & ADDITIONAL TESTS:    MEDICATIONS  (STANDING):  chlorhexidine 4% Liquid 1 Application(s) Topical <User Schedule>  dextrose 5%. 1000 milliLiter(s) (50 mL/Hr) IV Continuous <Continuous>  dextrose 50% Injectable 12.5 Gram(s) IV Push once  dextrose 50% Injectable 25 Gram(s) IV Push once  dextrose 50% Injectable 25 Gram(s) IV Push once  digoxin     Tablet 0.125 milliGRAM(s) Oral every 24 hours  diltiazem    milliGRAM(s) Oral every 24 hours  dorzolamide 2% Ophthalmic Solution 1 Drop(s) Right EYE <User Schedule>  influenza   Vaccine 0.5 milliLiter(s) IntraMuscular once  insulin lispro (HumaLOG) corrective regimen sliding scale   SubCutaneous three times a day before meals  pantoprazole    Tablet 40 milliGRAM(s) Oral before breakfast    MEDICATIONS  (PRN):  dextrose 40% Gel 15 Gram(s) Oral once PRN Blood Glucose LESS THAN 70 milliGRAM(s)/deciliter  glucagon  Injectable 1 milliGRAM(s) IntraMuscular once PRN Glucose LESS THAN 70 milligrams/deciliter

## 2019-10-12 NOTE — DISCHARGE NOTE PROVIDER - HOSPITAL COURSE
Ms. Cortez is an 86-year-old female history of spinal stenosis, hyperlipidemia, St Niraj PPM, chronic afib (on coumadin), diet controlled diabetes mellitus who presents for epigastric pain and discomfort found to have gastroenteritis.         Problem List/Main Diagnoses (system-based):     -Chest Pain    -Atrial Fibrillation    -Gastroenteritis    -Diabetes Mellitus    -Hyperlipidemia        Inpatient treatment course:     On 10/12, patient presents with epigastric pain associated with 4 episodes of vomiting that were nonbloody nonbilious and watery diarrhea. She also endorsed dizziness. Orthostatics were negative. EKG showed t wave inversions in lateral leads compared to 2018. Troponins were negative x 2. An echocardiogram showed 60% EF with left ventricular hypertrophy and severe tricuspid regurge. In the morning, her chest pain had improved and had no additional episodes vomiting or loose stools. Ms. Cortez' condition improved and no longer warranted hospitalization. Her condition was communicated with her at the bedside and she voiced her understanding.         New medications:     -None        Labs to be followed outpatient:     -INR for Coumadin for atrial fibrillation        Exam to be followed outpatient:     -Abdominal exam with PCP Ms. Cortez is an 86-year-old female history of spinal stenosis, hyperlipidemia, [employer] Niraj PPM, chronic afib (on coumadin), diet controlled diabetes mellitus who presents for epigastric pain and discomfort found to have gastroenteritis.         Problem List/Main Diagnoses (system-based):     -Chest Pain    -Atrial Fibrillation    -Gastroenteritis    -Diabetes Mellitus    -Hyperlipidemia        Inpatient treatment course:     On 10/12, patient presents with epigastric pain associated with 4 episodes of vomiting that were nonbloody nonbilious and watery diarrhea. She also endorsed dizziness. Orthostatics were negative. EKG showed t wave inversions in lateral leads compared to 2018. Troponins were negative x 2. An echocardiogram showed 60% EF with left ventricular hypertrophy and severe tricuspid regurge. In the morning, her chest pain had improved and had no additional episodes vomiting or loose stools. Ms. Cortez' condition improved and no longer warranted hospitalization. Her condition was communicated with her at the bedside and she voiced her understanding.         New medications:     -None        Labs to be followed outpatient:     -INR for Coumadin for atrial fibrillation 87 y/o F; PMH  of spinal stenosis, hyperlipidemia, [employer] Niraj PPM, chronic afib (on coumadin), diet controlled diabetes mellitus who is transferred to [facility] from [facility] 10/12 for epigastric pain and discomfort; nonradiating epigastric 9/10 pain assoc with NBNB vomiting x4, diarrhea x2; admitted to CCU for monitoring and workup. Symptoms resolved once in the CCU.  Trops negative x2, EKG not concerning for acute pathology, no longer having abd pain.  For infectious workup - WBC wnl and patient afebrile while in CCU with no urinary symptoms. AM CXR clear and RVP negative. Pt endorsed some dizziness but was orthostatics negative.  INR supratherapeutic and holding home coumadin. Patient is pending a PPM interrogation.  While in the CCU patient remained hemodynamically stable and is up for transfer to [facility] floor to 5U for any further work up and monitoring. PPM interrogated by CCU fellow was normal. + orthostatic resolved s/p IV hydration; Pt. had ECHO revealing normal EF with severe TR; CTA/P revealed Cholelithiasis. Compression fractures of L3 and L4 vertebral bodies, age indeterminate, but new from prior study dated 7/21/2008.Dilated retrohepatic IVC and hepatic veins with a dilated right atrium most likely 2/2 severe TR. Pt. seen by PT who recs home with no PT needs and just cane; Pt. also seen by neurology team for dizziness; epley maneuver performed and dizziness resolved (BPPV); pt. is at rate controlled afib; dig level low and home digoxin d/c and pt. added on BB Toprol 25 mg daily in addition to home diltiazem 180 mg daily; INR supratheurapeutic initially and home Coumadin held ; INR 1.9 today and plan to resume Coumadin today. pt. seen by EP team….             Pt. seen and examined at bedside today am. Pt. comfortable, denies any CP, SOB, dizziness, palpitations.  VSS. Labs stable o/n. home meds reviwed with Dr. Whitley and pt. to be d/c on Coumadin 5 mg daily; Toprol 25 mg daily; diltiazem 180 mg daily.        Pt. stable to be d/c as per Dr. Whitley and to f/u with her in 1 week. Case d/w son Dr. Pearson in great detail; prescription sent to [facility] as she will living with son in [facility] X 1 week. Also; pt. will f/u with Dr. whitley in 1 week to get INR checked and also pt. outpatient cradiologist has moved from [place] to another area and she has lost follow up; pt. would like to f/u with Dr. Whitley in [facility] as its close to her home.     Patient has been given appropriate discharge instructions including medication regimen, and follow up. 87 y/o F; PMH  of spinal stenosis, hyperlipidemia, St Niraj PPM, chronic afib (on coumadin), diet controlled diabetes mellitus who is transferred to [facility] from [facility] 10/12 for epigastric pain and discomfort; nonradiating epigastric 9/10 pain assoc with NBNB vomiting x4, diarrhea x2; admitted to CCU for monitoring and workup. Symptoms resolved once in the CCU.  Trops negative x2, EKG not concerning for acute pathology, no longer having abd pain.  For infectious workup - WBC wnl and patient afebrile while in CCU with no urinary symptoms. AM CXR clear and RVP negative. Pt endorsed some dizziness but was orthostatics negative.  INR supratherapeutic and holding home coumadin. Patient is pending a PPM interrogation.  While in the CCU patient remained hemodynamically stable and is up for transfer to [facility] floor to 5U for any further work up and monitoring. PPM interrogated by CCU fellow was normal. + orthostatic resolved s/p IV hydration; Pt. had ECHO revealing normal EF with severe TR; CTA/P revealed Cholelithiasis. Compression fractures of L3 and L4 vertebral bodies, age indeterminate, but new from prior study dated 7/21/2008.Dilated retrohepatic IVC and hepatic veins with a dilated right atrium most likely 2/2 severe TR. Pt. seen by PT who recs home with no PT needs and just cane; Pt. also seen by neurology team for dizziness; epley maneuver performed and dizziness resolved (BPPV); pt. is at rate controlled afib; dig level low and home digoxin d/c and pt. added on BB Toprol 25 mg daily in addition to home diltiazem 180 mg daily; INR supratheurapeutic initially and home Coumadin held ; INR 1.9 today and plan to resume Coumadin today. pt. seen by EP team; interrogated device which was functioning normally            Pt. seen and examined at bedside today am. Pt. comfortable, denies any CP, SOB, dizziness, palpitations.  VSS. Labs stable o/n. home meds reviwed with Dr. Whitley and pt. to be d/c on Coumadin 5 mg daily; Toprol 25 mg daily; diltiazem 180 mg daily.        Pt. stable to be d/c as per Dr. Whitley and to f/u with her in 1 week. Case d/w son Dr. Pearson in great detail; prescription sent to [facility] at [facility] as she will living with son in [facility] X 1 week. Also; pt. will f/u with Dr. whitley in 1 week to get INR checked and also pt. outpatient cradiologist has moved from [place] to another area and she has lost follow up; pt. would like to f/u with Dr. Whitley in [place] clinic as its close to her home.     Patient has been given appropriate discharge instructions including medication regimen, and follow up. 87 y/o F; R eye blindness; PMH  of spinal stenosis, hyperlipidemia, St Niraj PPM, chronic afib (on coumadin), diet controlled diabetes mellitus who is transferred to [facility] from [facility] 10/12 for epigastric pain and discomfort; nonradiating epigastric 9/10 pain assoc with NBNB vomiting x4, diarrhea x2; admitted to CCU for monitoring and workup. Symptoms resolved once in the CCU.  Trops negative x2, EKG not concerning for acute pathology, no longer having abd pain.  For infectious workup - WBC wnl and patient afebrile while in CCU with no urinary symptoms. AM CXR clear and RVP negative. Pt endorsed some dizziness but was orthostatics negative.  INR supratherapeutic and holding home coumadin. Patient is pending a PPM interrogation.  While in the CCU patient remained hemodynamically stable and is up for transfer to [facility] floor to 5U for any further work up and monitoring. PPM interrogated by CCU fellow was normal. + orthostatic resolved s/p IV hydration; Pt. had ECHO revealing normal EF with severe TR; CTA/P revealed Cholelithiasis. Compression fractures of L3 and L4 vertebral bodies, age indeterminate, but new from prior study dated 7/21/2008.Dilated retrohepatic IVC and hepatic veins with a dilated right atrium most likely 2/2 severe TR. Pt. seen by PT who recs home with no PT needs and just cane; Pt. also seen by neurology team for dizziness; epley maneuver performed and dizziness resolved (BPPV); pt. is at rate controlled afib; dig level low and home digoxin d/c and pt. added on BB Toprol 25 mg daily in addition to home diltiazem 180 mg daily; INR supratheurapeutic initially and home Coumadin held ; INR 1.9 today and plan to resume Coumadin today. pt. seen by EP team; interrogated device which was functioning normally            Pt. seen and examined at bedside today am. Pt. comfortable, denies any CP, SOB, dizziness, palpitations.  VSS. Labs stable o/n. home meds reviwed with Dr. Whitley and pt. to be d/c on Coumadin 5 mg daily; Toprol 25 mg daily; diltiazem 180 mg daily.        Pt. stable to be d/c as per Dr. Whitley and to f/u with her in 1 week. Case d/w son Dr. Pearson in great detail; prescription sent to [facility] at [facility] as she will living with son in [facility] X 1 week. Also; pt. will f/u with Dr. whitley in 1 week to get INR checked and also pt. outpatient cradiologist has moved from [place] to another area and she has lost follow up; pt. would like to f/u with Dr. Whitley in [place] clinic as its close to her home.     Patient has been given appropriate discharge instructions including medication regimen, and follow up.

## 2019-10-12 NOTE — PROGRESS NOTE ADULT - ASSESSMENT
Ms. Cortez is an 86-year-old female history of [employer] Niraj PPM, chronic afib (on coumadin), diet controlled diabetes mellitus who presents acute epigastric pain associated with vomiting/diarrhea, chronic dry cough and chest tightness admitted to the CCU for monitoring now with resolution of symptoms and tolerating PO well; negative troponins x2, no acute changes on EKG, unconcerning bedside and formal echo with EF60%, now being stepped down from CCU to [facility]ris for further monitoring    NEURO  #Glaucoma/cataracts  -cw eyedrops dorzolamide daily    CARDIOVASCULAR  #Chest discomfort - came from [facility], abd discomfort for 1 day after having some coffee with 4 episodes NBNB vomiting, watery diarrhea x2, no sick contacts, no fevers, endorses dizziness, also chronic dry cough at night for months and chest tightness for months; WBC normal on admission and in AM, remains afebrile; no known CAD (has Afib), pt endorses chronic dry cough, transferred to [facility] CCU for monitoring; EKG with new Twave inversions in lateral leads compared to 2018 EKG; trops negative x2, pt with symptoms resolving. Echo grossly normal with EF 60%; unlikely ischemic cause. Symptoms now resolved, was likely due to gastroenteritis. RVP negative, CXR clear, orthostatics negative. Dizziness likely in setting of decreased PO intake with volume loss from vomiting/diarrhea that has now resolved.  -pending device interrogation    #Afib - s/p pacemaker placed in 2016 by Dr. Moran ([phone]), family unsure exact reason of PM placement. INR~4  -obtain collateral in AM  -cw digoxin 0.125mg daily  -cw diltiazem 180mg daily  -cw coumadin 5mg daily based on INR; titrate to goal INR 2-3    PULMONARY  #JAMESON  GASTROINTESTINAL  #Epigastric pain - Pt with epigastric pain assoc w/NBNB vomiting and diarrhea that has now resolved with decreased appetite. Afebrile with no WBC elevation and no sick contacts. Pain possibly 2/2 gastroenteritis that is resolving, abdomen benign on exam.   -As above  -simethicone prn for bloating    RENAL  #JAMESON    INFECTIOUS DISEASE - patient without elevated WBC count, no fevers or recent illnesses, RVP negative, cxr clear  #Gastroenteritis  - as above    ENDOCRINE  #DM - reported to have diabetes, not on any meds, only diet controlled, A1c 6.3 on this admission (10/12/19)  -ISS      FLUIDS/ELECTROLYTES/NUTRITION  -IVF; ad libitum  -Monitor, Replete to K>4 and Mg>2  -Diet DASH  PROPHYLAXIS  -DVT  -GI    DISPO  CODE STATUS Ms. Cortez is an 86-year-old female history of [facility] Niraj PPM, chronic afib (on coumadin), diet controlled diabetes mellitus who presents acute epigastric pain associated with vomiting/diarrhea, chronic dry cough and chest tightness admitted to the CCU for monitoring now with resolution of symptoms and tolerating PO well; negative troponins x2, no acute changes on EKG, unconcerning bedside and formal echo with EF60%, now being stepped down from CCU to [facility] for further monitoring

## 2019-10-12 NOTE — H&P ADULT - HISTORY OF PRESENT ILLNESS
86-year-old anxious female history of lymphonodular hyperplasia of the lungs, spinal stenosis, hyperlipidemia, St Niraj PPM, chronic afib, diet controlled diabetes mellitus with persistent fatigue. I would encourage her to get out of the house more frequently to walk and socialize. INR=2.3 therapeutic. She is also getting forgetful Ms. Cortez is an 86-year-old anxious female history of spinal stenosis, hyperlipidemia, St Niraj PPM, chronic afib, diet controlled diabetes mellitus who presents for epigastric pain and discomfort. Pt had some coffee yesterday morning and started feeling some stomach discomfort. She reports a nonradiating epigastric 9/10 pain. She reports four episodes of vomiting since yesterday AM which she says relieved her pain/discomfort which is now resolved. The vomit consisted of some food contents but mostly water, NBNB; pt was unable to say if it was associated with attempting PO intake because of decreased appetite. She endorsed two episodes of watery diarrhead over the past day, unsure if it was more foul smelling than usual. She also reported feeling dizzy during over the past day. Ms. Cortez endorses a chronic dry cough that has been present for months which is worse at bedtime as well as chronic chest tightness over the middle of her chest which is constant (unchanged with rest or exertion) for months.   Denies fevers, chills, recent illnesses, sick contacts Ms. Cortez is an 86-year-old female history of spinal stenosis, hyperlipidemia, St Niraj PPM, chronic afib, diet controlled diabetes mellitus who presents for epigastric pain and discomfort. Pt had some coffee yesterday morning and started feeling some stomach discomfort. She reports a nonradiating epigastric 9/10 pain. She reports four episodes of vomiting since yesterday AM which she says relieved her pain/discomfort which is now resolved. The vomit consisted of some food contents but mostly water, NBNB; pt was unable to say if it was associated with attempting PO intake because of decreased appetite. She endorsed two episodes of watery diarrhead over the past day, unsure if it was more foul smelling than usual. She also reported feeling dizzy during over the past day. Ms. Cortez endorses a chronic dry cough that has been present for months which is worse at bedtime as well as chronic chest tightness over the middle of her chest which is constant (unchanged with rest or exertion) for months.   Denies fevers, chills, recent illnesses, sick contacts Ms. Cortez is an 86-year-old female history of spinal stenosis, hyperlipidemia, St Niraj PPM, chronic afib (on coumadin), diet controlled diabetes mellitus who presents for epigastric pain and discomfort. Pt had some coffee yesterday morning and started feeling some stomach discomfort. She reports a nonradiating epigastric 9/10 pain. She reports four episodes of vomiting since yesterday AM which she says relieved her pain/discomfort which is now resolved. The vomit consisted of some food contents but mostly water, NBNB; pt was unable to say if it was associated with attempting PO intake because of decreased appetite. She endorsed two episodes of watery diarrhead over the past day, unsure if it was more foul smelling than usual. She also reported feeling dizzy during over the past day. Ms. Cortez endorses a chronic dry cough that has been present for months which is worse at bedtime as well as chronic chest tightness over the middle of her chest which is constant (unchanged with rest or exertion) for months.   Denies fevers, chills, recent illnesses, sick contacts, denies dysuria or increased frequency. Ms. Cortez is an 86-year-old female history of spinal stenosis, hyperlipidemia, St Niraj PPM, chronic afib (on coumadin, INR goal 2-3), diet controlled diabetes mellitus who presents for epigastric pain and discomfort. Pt had some coffee yesterday morning and started feeling some stomach discomfort. She reports a nonradiating epigastric 9/10 pain. She reports four episodes of vomiting since yesterday AM which she says relieved her pain/discomfort which is now resolved. The vomit consisted of some food contents but mostly water, NBNB; pt was unable to say if it was associated with attempting PO intake because of decreased appetite. She endorsed two episodes of watery diarrhead over the past day, unsure if it was more foul smelling than usual. She also reported feeling dizzy during over the past day. Ms. Cortez endorses a chronic dry cough that has been present for months which is worse at bedtime as well as chronic chest tightness over the middle of her chest which is constant (unchanged with rest or exertion) for months.   Denies fevers, chills, recent illnesses, sick contacts, denies dysuria or increased frequency.

## 2019-10-12 NOTE — H&P ADULT - NSHPSOCIALHISTORY_GEN_ALL_CORE
Lives at home by herself, able to walk up one flight of stairs slowly with no issues.  Denies tobacco, alcohol or recreational drug use ever.

## 2019-10-12 NOTE — PROGRESS NOTE ADULT - ASSESSMENT
Ms. Cortez is an 86-year-old female history of [employer] Niraj PPM, chronic afib (on coumadin), diet controlled diabetes mellitus who presents acute epigastric pain associated with vomiting/diarrhea, chronic dry cough and chest tightness admitted to the CCU for monitoring now with resolution of symptoms and tolerating PO well; negative troponins x2, no acute changes on EKG, unconcerning bedside and formal echo with EF60%, now being stepped down from CCU to [facility] for further monitoring    NEURO  #Glaucoma/cataracts  -cw eyedrops dorzolamide daily    CARDIOVASCULAR  #Chest discomfort - came from [facility], abd discomfort for 1 day after having some coffee with 4 episodes NBNB vomiting, watery diarrhea x2, no sick contacts, no fevers, endorses dizziness, also chronic dry cough at night for months and chest tightness for months; WBC normal on admission and in AM, remains afebrile; no known CAD (has Afib), pt endorses chronic dry cough, transferred to [facility] CCU for monitoring; EKG with new Twave inversions in lateral leads compared to 2018 EKG; trops negative x2, pt with symptoms resolving. Echo grossly normal with EF 60%; unlikely ischemic cause. Symptoms now resolved, was likely due to gastroenteritis. RVP negative, CXR clear, orthostatics negative. Dizziness likely in setting of decreased PO intake with volume loss from vomiting/diarrhea that has now resolved.  -pending device interrogation    #Afib - s/p pacemaker placed in 2016 by Dr. Moran ([phone]), family unsure exact reason of PM placement. INR~4  -obtain collateral in AM  -cw digoxin 0.125mg daily  -cw diltiazem 180mg daily  -cw coumadin 5mg daily based on INR; titrate to goal INR 2-3    PULMONARY  #JAMESON  GASTROINTESTINAL  #Epigastric pain - Pt with epigastric pain assoc w/NBNB vomiting and diarrhea that has now resolved with decreased appetite. Afebrile with no WBC elevation and no sick contacts. Pain possibly 2/2 gastroenteritis that is resolving, abdomen benign on exam.   -As above  -simethicone prn for bloating    RENAL  #JAMESON    INFECTIOUS DISEASE - patient without elevated WBC count, no fevers or recent illnesses, RVP negative, cxr clear  #Gastroenteritis  - as above    ENDOCRINE  #DM - reported to have diabetes, not on any meds, only diet controlled, A1c 6.3 on this admission (10/12/19)  -ISS      FLUIDS/ELECTROLYTES/NUTRITION  -IVF; ad libitum  -Monitor, Replete to K>4 and Mg>2  -Diet DASH  PROPHYLAXIS  -DVT  -GI    DISPO  CODE STATUS

## 2019-10-12 NOTE — H&P ADULT - NSHPPHYSICALEXAM_GEN_ALL_CORE
Vital Signs Last 12 Hrs  T(F): 98.3 (10-12-19 @ 01:57), Max: 98.3 (10-12-19 @ 01:57)  HR: 64 (10-12-19 @ 02:00) (64 - 66)  BP: 142/66 (10-12-19 @ 02:00) (137/64 - 142/66)  BP(mean): 96 (10-12-19 @ 02:00) (96 - 98)  RR: 16 (10-12-19 @ 02:00) (16 - 17)  SpO2: 99% (10-12-19 @ 02:00) (98% - 99%)  I&O's Summary      PHYSICAL EXAM:  Constitutional: NAD, comfortable in bed, calm pleasant and cooperative  HEENT: NC/AT, EOMI, no conjunctival pallor or scleral icterus, MMM  Neck: Supple, no JVD, trachea midline  Respiratory: Normal rate, rhythm, depth, effort. CTAB. No w/r/r.   Cardiovascular: RRR, normal S1 and S2, no m/r/g.   Gastrointestinal: soft NTND, no guarding or rebound tenderness, no palpable masses   Extremities: wwp; no edema.   Vascular: Pulses present throughout  Neurological: Alert, strength and sensation to light touch intact throughout grossly

## 2019-10-12 NOTE — DISCHARGE NOTE PROVIDER - CARE PROVIDER_API CALL
Jackson?,   Phone: (   )    -  Fax: (   )    -  Follow Up Time: Dr. whitley, [facility]  [facility] [place] [address]  Phone: [phone]  Fax: (   )    -  Follow Up Time:

## 2019-10-12 NOTE — DISCHARGE NOTE PROVIDER - NSDCCPTREATMENT_GEN_ALL_CORE_FT
PRINCIPAL PROCEDURE  Procedure: Complete echocardiography  Findings and Treatment: An echocardiogram was performed on your heart to evaluate its function. The test showed that you have an ejection fraction of 60% with left ventricular hypertrophy, aortic valve thickening, mild to moderatea aortic stenosis, mitral valvce thickening, and severe triscuspid regurge. PRINCIPAL PROCEDURE  Procedure: Complete echocardiography  Findings and Treatment: An echocardiogram was performed on your heart to evaluate its function. The test showed that you have an ejection fraction of 60% with left ventricular hypertrophy, aortic valve thickening, mild to moderate aortic stenosis, mitral valve thickening, and severe triscuspid regurgitation.

## 2019-10-12 NOTE — PROGRESS NOTE ADULT - PROBLEM SELECTOR PLAN 3
Afib, s/p pacemaker placed in 2016 by Dr. Moran ([phone]), family unsure exact reason of PM placement.  - Continue digoxin 0.125mg daily, and diltiazem 180mg daily  - INR supra therapeutic.  Today 3.94.  Continue holding home Coumadin 5mg (goal INR 2-3).   - Pending PPM interrogation (Fellow to do).

## 2019-10-12 NOTE — H&P ADULT - ATTENDING COMMENTS
Critical Care Attestation:    I have spent 50 minutes administering critical care to patient  that does not include procedure or teaching.    Pt is an 85 y/o woman c spinal stenosis, HPL, PPM chonic fib on coumadin, diet controlled DM who p/w epigastric pain for eval.    ECG: v-paced  ECG: nsr c RBBB like morphology and lateral T wave inversions (under paced rhythm)    afeb, HR 60, 130/60, 99% RA    Hgb 12.3  Plt 159  INR 3.94  K 4.3  Cr 0.47  Darrell -ve * 2    - checked tte, and grossly normal  - pt r/o MI  - cont meds for fib/ hold ac for now given hi INR  - start PPI for possible gastritis  - pt also with dizziness, consider [facility] and neuro consult  - would ambulate pt    CXR: no acute card pulm path

## 2019-10-13 LAB
ANION GAP SERPL CALC-SCNC: 9 MMOL/L — SIGNIFICANT CHANGE UP (ref 5–17)
APTT BLD: 38.5 SEC — HIGH (ref 27.5–36.3)
BUN SERPL-MCNC: 19 MG/DL — SIGNIFICANT CHANGE UP (ref 7–23)
CALCIUM SERPL-MCNC: 9.1 MG/DL — SIGNIFICANT CHANGE UP (ref 8.4–10.5)
CHLORIDE SERPL-SCNC: 103 MMOL/L — SIGNIFICANT CHANGE UP (ref 96–108)
CO2 SERPL-SCNC: 30 MMOL/L — SIGNIFICANT CHANGE UP (ref 22–31)
CREAT SERPL-MCNC: 0.71 MG/DL — SIGNIFICANT CHANGE UP (ref 0.5–1.3)
GLUCOSE SERPL-MCNC: 135 MG/DL — HIGH (ref 70–99)
HCT VFR BLD CALC: 40 % — SIGNIFICANT CHANGE UP (ref 34.5–45)
HGB BLD-MCNC: 12.7 G/DL — SIGNIFICANT CHANGE UP (ref 11.5–15.5)
MAGNESIUM SERPL-MCNC: 2 MG/DL — SIGNIFICANT CHANGE UP (ref 1.6–2.6)
MCHC RBC-ENTMCNC: 30.9 PG — SIGNIFICANT CHANGE UP (ref 27–34)
MCHC RBC-ENTMCNC: 31.8 GM/DL — LOW (ref 32–36)
MCV RBC AUTO: 97.3 FL — SIGNIFICANT CHANGE UP (ref 80–100)
NRBC # BLD: 0 /100 WBCS — SIGNIFICANT CHANGE UP (ref 0–0)
PLATELET # BLD AUTO: 153 K/UL — SIGNIFICANT CHANGE UP (ref 150–400)
POTASSIUM SERPL-MCNC: 4.7 MMOL/L — SIGNIFICANT CHANGE UP (ref 3.5–5.3)
POTASSIUM SERPL-SCNC: 4.7 MMOL/L — SIGNIFICANT CHANGE UP (ref 3.5–5.3)
RBC # BLD: 4.11 M/UL — SIGNIFICANT CHANGE UP (ref 3.8–5.2)
RBC # FLD: 12.4 % — SIGNIFICANT CHANGE UP (ref 10.3–14.5)
SODIUM SERPL-SCNC: 142 MMOL/L — SIGNIFICANT CHANGE UP (ref 135–145)
WBC # BLD: 6.26 K/UL — SIGNIFICANT CHANGE UP (ref 3.8–10.5)
WBC # FLD AUTO: 6.26 K/UL — SIGNIFICANT CHANGE UP (ref 3.8–10.5)

## 2019-10-13 PROCEDURE — 71045 X-RAY EXAM CHEST 1 VIEW: CPT | Mod: 26

## 2019-10-13 PROCEDURE — 93880 EXTRACRANIAL BILAT STUDY: CPT | Mod: 26

## 2019-10-13 PROCEDURE — 99232 SBSQ HOSP IP/OBS MODERATE 35: CPT

## 2019-10-13 PROCEDURE — 74177 CT ABD & PELVIS W/CONTRAST: CPT | Mod: 26

## 2019-10-13 RX ORDER — IOHEXOL 300 MG/ML
30 INJECTION, SOLUTION INTRAVENOUS ONCE
Refills: 0 | Status: COMPLETED | OUTPATIENT
Start: 2019-10-13 | End: 2019-10-13

## 2019-10-13 RX ORDER — SODIUM CHLORIDE 9 MG/ML
500 INJECTION INTRAMUSCULAR; INTRAVENOUS; SUBCUTANEOUS
Refills: 0 | Status: DISCONTINUED | OUTPATIENT
Start: 2019-10-13 | End: 2019-10-14

## 2019-10-13 RX ORDER — PANTOPRAZOLE SODIUM 20 MG/1
1 TABLET, DELAYED RELEASE ORAL
Qty: 30 | Refills: 0
Start: 2019-10-13 | End: 2019-11-11

## 2019-10-13 RX ADMIN — DORZOLAMIDE HYDROCHLORIDE 1 DROP(S): 20 SOLUTION/ DROPS OPHTHALMIC at 06:44

## 2019-10-13 RX ADMIN — PANTOPRAZOLE SODIUM 40 MILLIGRAM(S): 20 TABLET, DELAYED RELEASE ORAL at 06:46

## 2019-10-13 RX ADMIN — Medication 180 MILLIGRAM(S): at 06:44

## 2019-10-13 RX ADMIN — DORZOLAMIDE HYDROCHLORIDE 1 DROP(S): 20 SOLUTION/ DROPS OPHTHALMIC at 20:09

## 2019-10-13 RX ADMIN — Medication 0.12 MILLIGRAM(S): at 06:46

## 2019-10-13 RX ADMIN — SODIUM CHLORIDE 100 MILLILITER(S): 9 INJECTION INTRAMUSCULAR; INTRAVENOUS; SUBCUTANEOUS at 20:28

## 2019-10-13 RX ADMIN — IOHEXOL 30 MILLILITER(S): 300 INJECTION, SOLUTION INTRAVENOUS at 14:30

## 2019-10-13 NOTE — PROGRESS NOTE ADULT - PROBLEM SELECTOR PLAN 5
-cw eyedrops dorzolamide daily    VTE: INR supra therapeutic  Dispo: pending PPM interrogation, discuss any further work up w/ attending. -cw eyedrops dorzolamide daily    VTE: INR supra therapeutic, coumadin to restart when INR within range.  Dispo: pending PPM interrogation, discuss any further work up w/ attending.

## 2019-10-13 NOTE — PROGRESS NOTE ADULT - PROBLEM SELECTOR PLAN 1
epigastric/Chest discomfort resolved. likely 2/2 Gastroenteritis  - EKG with new Twave inversions in lateral leads compared to 2018 EKG  - CE negative x2  - Echo grossly normal with EF 60%; unlikely ischemic cause.  - Device interrogated yesterday, no acute findings.

## 2019-10-13 NOTE — PROGRESS NOTE ADULT - PROBLEM SELECTOR PLAN 3
Afib, s/p pacemaker placed in 2016 by Dr. Moran ([phone]), family unsure exact reason of PM placement.  - Continue digoxin 0.125mg daily (level 0.6), and diltiazem 180mg daily  - INR was supratherapeutic @ 3.94 on admission, therapeutic this morning @ 3.  Will follow with attending recs for restarting.  - PPM interrogation with no acute findings

## 2019-10-13 NOTE — PROGRESS NOTE ADULT - PROBLEM SELECTOR PLAN 2
epigastric pain assoc w/NBNB vomiting and diarrhea that has now resolved.   - remains afebrile w/ no leukocytosis.  RVP negative, CXR clear.   - Abd exam benign  - simethicone prn for bloating epigastric pain assoc w/NBNB vomiting and diarrhea that has now resolved.   - remains afebrile w/ no leukocytosis.  RVP negative, CXR clear.   - Abd exam benign  - simethicone prn for bloating  - CT Abdomen/Pelvis with IV and Oral contrast ordered, patient will be NPO.

## 2019-10-13 NOTE — PROGRESS NOTE ADULT - ASSESSMENT
Ms. Cortez is an 86-year-old female history of [facility] PPM, chronic afib (on coumadin), diet controlled diabetes mellitus who presents acute epigastric pain associated with vomiting/diarrhea, chronic dry cough and chest tightness admitted to the CCU for monitoring now with resolution of symptoms and tolerating PO well; negative troponins x2, no acute changes on EKG, unconcerning bedside and formal echo with EF60%, now stepped down from CCU to [facility] for further monitoring

## 2019-10-13 NOTE — PROGRESS NOTE ADULT - PROBLEM SELECTOR PLAN 3
Afib, s/p pacemaker placed in 2016 by Dr. Moran [phone]), family unsure exact reason of PM placement.  - Continue digoxin 0.125mg daily, and diltiazem 180mg daily  - INR was supratherapeutic @ 3.94 on admission, therapeutic this morning @ 3.  Will follow with attending recs for restarting.  - Pending PPM interrogation (Fellow to do). Afib, s/p pacemaker placed in 2016 by Dr. Moran ([phone]), family unsure exact reason of PM placement.  - Continue digoxin 0.125mg daily (level 0.6), and diltiazem 180mg daily  - INR was supratherapeutic @ 3.94 on admission, therapeutic this morning @ 3.  Will follow with attending recs for restarting.  - PPM interrogation with no acute findings

## 2019-10-13 NOTE — PROGRESS NOTE ADULT - PROBLEM SELECTOR PLAN 5
-cw eyedrops dorzolamide daily    VTE: INR supra therapeutic, coumadin to restart when INR within range.  Dispo: pending PPM interrogation, discuss any further work up w/ attending.

## 2019-10-13 NOTE — PROGRESS NOTE ADULT - SUBJECTIVE AND OBJECTIVE BOX
Interval Events: Reviewed  Patient seen and examined at bedside.    Patient is a 88y old  Female who presents with a chief complaint of Chest/epigastric pain (13 Oct 2019 08:54).   Pt states she feels better, tolerated lunch, now drinking contrast for CT scan       PAST MEDICAL & SURGICAL HISTORY:  Spinal stenosis  No significant past surgical history      MEDICATIONS:  Pulmonary:    Antimicrobials:    Anticoagulants:    Cardiac:  digoxin     Tablet 0.125 milliGRAM(s) Oral every 24 hours  diltiazem    milliGRAM(s) Oral every 24 hours      Allergies    codeine (Unknown)  latex (Rash)  morphine (Unknown)  Percocet 2.5/325 (Vomiting)    Intolerances        Vital Signs Last 24 Hrs  T(C): 36.7 (13 Oct 2019 13:29), Max: 36.9 (12 Oct 2019 17:00)  T(F): 98 (13 Oct 2019 13:29), Max: 98.5 (12 Oct 2019 20:23)  HR: 77 (13 Oct 2019 08:45) (64 - 77)  BP: 131/63 (13 Oct 2019 08:45) (112/72 - 157/72)  BP(mean): 73 (12 Oct 2019 21:00) (73 - 88)  RR: 18 (13 Oct 2019 08:45) (18 - 36)  SpO2: 98% (13 Oct 2019 08:45) (98% - 100%)    10-12 @ 07:01  -  10-13 @ 07:00  --------------------------------------------------------  IN: 600 mL / OUT: 800 mL / NET: -200 mL    10-13 @ 07:01  -  10-13 @ 15:55  --------------------------------------------------------  IN: 420 mL / OUT: 0 mL / NET: 420 mL          Review of Systems:   •	General: negative  •	Skin/Breast: negative  •	Ophthalmologic: negative  •	ENMT: negative  •	Respiratory and Thorax: negative  •	Cardiovascular: negative  •	Gastrointestinal: negative  •	Genitourinary: negative  •	Musculoskeletal: negative  •	Neurological: negative  •	Psychiatric: negative  •	Hematology/Lymphatics: negative  •	Endocrine: negative  •	Allergic/Immunologic: negative    Physical Exam:   • Constitutional:	Well-developed, well nourished  • Eyes:	EOMI; PERRL; no drainage or redness  • ENMT:	No oral lesions; no gross abnormalities  • Neck	No bruits; no thyromegaly or nodules  • Breasts:	not examined  • Back:	No deformity or limitation of movement  • Respiratory:	Breath Sounds equal & clear to auscultation, no accessory muscle use  • Cardiovascular:	Regular rate & rhythm, normal S1, S2; no murmurs, gallops or rubs; no S3, S4  • Gastrointestinal:	Soft, non-tender, no hepatosplenomegaly, normal bowel sounds  • Genitourinary:	not examined  • Rectal: not examined  • Extremities:	No cyanosis, clubbing or edema  • Vascular:	Equal and normal pulses (dorsalis pedis)  • Neurologica:l	not examined  • Skin:	No lesions; no rash  • Lymph Nodes:	No lymphadedenopathy  • Musculoskeletal:	No joint pain, swelling or deformity; no limitation of movement        LABS:      CBC Full  -  ( 13 Oct 2019 06:22 )  WBC Count : 6.26 K/uL  RBC Count : 4.11 M/uL  Hemoglobin : 12.7 g/dL  Hematocrit : 40.0 %  Platelet Count - Automated : 153 K/uL  Mean Cell Volume : 97.3 fl  Mean Cell Hemoglobin : 30.9 pg  Mean Cell Hemoglobin Concentration : 31.8 gm/dL  Auto Neutrophil # : x  Auto Lymphocyte # : x  Auto Monocyte # : x  Auto Eosinophil # : x  Auto Basophil # : x  Auto Neutrophil % : x  Auto Lymphocyte % : x  Auto Monocyte % : x  Auto Eosinophil % : x  Auto Basophil % : x    10-13    142  |  103  |  19  ----------------------------<  135<H>  4.7   |  30  |  0.71    Ca    9.1      13 Oct 2019 06:22  Phos  2.9     10-12  Mg     2.0     10-13    TPro  6.5  /  Alb  3.9  /  TBili  0.6  /  DBili  x   /  AST  18  /  ALT  10  /  AlkPhos  44  10-12    PT/INR - ( 13 Oct 2019 06:22 )   PT: 35.1 sec;   INR: 3.00          PTT - ( 13 Oct 2019 06:22 )  PTT:38.5 sec                    RADIOLOGY & ADDITIONAL STUDIES (The following images were personally reviewed):  Conner:                                     No  Urine output:                       adequate  DVT prophylaxis:                 Yes  Flattus:                                  Yes  Bowel movement:              No

## 2019-10-13 NOTE — PROGRESS NOTE ADULT - PROBLEM SELECTOR PLAN 2
epigastric pain assoc w/NBNB vomiting and diarrhea that has now resolved.   - remains afebrile w/ no leukocytosis.  RVP negative, CXR clear.   - Abd exam benign  - simethicone prn for bloating  - CT Abdomen/Pelvis with IV and Oral contrast ordered, patient will be NPO.

## 2019-10-13 NOTE — PROGRESS NOTE ADULT - PROBLEM SELECTOR PLAN 1
epigastric/Chest discomfort resolved. likely 2/2 Gastroenteritis  - EKG with new Twave inversions in lateral leads compared to 2018 EKG  - CE negative x2  - Echo grossly normal with EF 60%; unlikely ischemic cause.  -pending device interrogation epigastric/Chest discomfort resolved. likely 2/2 Gastroenteritis  - EKG with new Twave inversions in lateral leads compared to 2018 EKG  - CE negative x2  - Echo grossly normal with EF 60%; unlikely ischemic cause.  - Device interrogated yesterday, no acute findings.

## 2019-10-13 NOTE — PROGRESS NOTE ADULT - SUBJECTIVE AND OBJECTIVE BOX
Interventional Cardiology NP Adult Progress Note    CC: Epigrastric Pain    Subjective Assessment/Interval HPI: JESSENIA overnight, seen at bedside this morning.  Epigastric pain improved overnight, now in bed eating.  Denies any chest pain, nausea, vomiting, diarrhea.  	  MEDICATIONS:  digoxin     Tablet 0.125 milliGRAM(s) Oral every 24 hours  diltiazem    milliGRAM(s) Oral every 24 hours          pantoprazole    Tablet 40 milliGRAM(s) Oral before breakfast    dextrose 40% Gel 15 Gram(s) Oral once PRN  dextrose 50% Injectable 12.5 Gram(s) IV Push once  dextrose 50% Injectable 25 Gram(s) IV Push once  dextrose 50% Injectable 25 Gram(s) IV Push once  glucagon  Injectable 1 milliGRAM(s) IntraMuscular once PRN  insulin lispro (HumaLOG) corrective regimen sliding scale   SubCutaneous three times a day before meals    dextrose 5%. 1000 milliLiter(s) IV Continuous <Continuous>  dorzolamide 2% Ophthalmic Solution 1 Drop(s) Right EYE <User Schedule>  influenza   Vaccine 0.5 milliLiter(s) IntraMuscular once      	  TELEMETRY:     T(C): 36.1 (10-13-19 @ 06:05), Max: 36.9 (10-12-19 @ 09:55)  HR: 64 (10-13-19 @ 05:55) (64 - 74)  BP: 157/72 (10-13-19 @ 05:55) (100/64 - 157/72)  RR: 18 (10-13-19 @ 05:55) (14 - 36)  SpO2: 100% (10-13-19 @ 05:55) (97% - 100%)  Wt(kg): --  I&O's Summary    12 Oct 2019 07:01  -  13 Oct 2019 07:00  --------------------------------------------------------  IN: 600 mL / OUT: 800 mL / NET: -200 mL                     LABS:	 	  CARDIAC MARKERS:  CARDIAC MARKERS ( 12 Oct 2019 06:14 )  x     / <0.01 ng/mL / 68 U/L / x     / 2.9 ng/mL  CARDIAC MARKERS ( 12 Oct 2019 01:53 )  x     / <0.01 ng/mL / 77 U/L / x     / 3.4 ng/mL                                      12.7   6.26  )-----------( 153      ( 13 Oct 2019 06:22 )             40.0     10-13    142  |  103  |  19  ----------------------------<  135<H>  4.7   |  30  |  0.71    Ca    9.1      13 Oct 2019 06:22  Phos  2.9     10-12  Mg     2.0     10-13    TPro  6.5  /  Alb  3.9  /  TBili  0.6  /  DBili  x   /  AST  18  /  ALT  10  /  AlkPhos  44  10-12    proBNP:   Lipid Profile: 10-12 Chol 153 LDL 61 HDL 81 Trig 56, 10-12 Chol 166 LDL 67 HDL 88 Trig 54  HgA1c:   TSH: Thyroid Stimulating Hormone, Serum: 0.555 uIU/mL (10-12 @ 01:53)    PT/INR - ( 13 Oct 2019 06:22 )   PT: 35.1 sec;   INR: 3.00          PTT - ( 13 Oct 2019 06:22 )  PTT:38.5 sec

## 2019-10-14 ENCOUNTER — TRANSCRIPTION ENCOUNTER (OUTPATIENT)
Age: 84
End: 2019-10-14

## 2019-10-14 VITALS — TEMPERATURE: 98 F

## 2019-10-14 LAB
ANION GAP SERPL CALC-SCNC: 10 MMOL/L — SIGNIFICANT CHANGE UP (ref 5–17)
APTT BLD: 32.1 SEC — SIGNIFICANT CHANGE UP (ref 27.5–36.3)
BUN SERPL-MCNC: 21 MG/DL — SIGNIFICANT CHANGE UP (ref 7–23)
CALCIUM SERPL-MCNC: 8.9 MG/DL — SIGNIFICANT CHANGE UP (ref 8.4–10.5)
CHLORIDE SERPL-SCNC: 104 MMOL/L — SIGNIFICANT CHANGE UP (ref 96–108)
CO2 SERPL-SCNC: 27 MMOL/L — SIGNIFICANT CHANGE UP (ref 22–31)
CREAT SERPL-MCNC: 0.58 MG/DL — SIGNIFICANT CHANGE UP (ref 0.5–1.3)
GLUCOSE BLDC GLUCOMTR-MCNC: 148 MG/DL — HIGH (ref 70–99)
GLUCOSE BLDC GLUCOMTR-MCNC: 192 MG/DL — HIGH (ref 70–99)
GLUCOSE SERPL-MCNC: 106 MG/DL — HIGH (ref 70–99)
HCT VFR BLD CALC: 37.1 % — SIGNIFICANT CHANGE UP (ref 34.5–45)
HGB BLD-MCNC: 12.1 G/DL — SIGNIFICANT CHANGE UP (ref 11.5–15.5)
INR BLD: 1.95 — HIGH (ref 0.88–1.16)
MAGNESIUM SERPL-MCNC: 1.9 MG/DL — SIGNIFICANT CHANGE UP (ref 1.6–2.6)
MCHC RBC-ENTMCNC: 31.3 PG — SIGNIFICANT CHANGE UP (ref 27–34)
MCHC RBC-ENTMCNC: 32.6 GM/DL — SIGNIFICANT CHANGE UP (ref 32–36)
MCV RBC AUTO: 96.1 FL — SIGNIFICANT CHANGE UP (ref 80–100)
NRBC # BLD: 0 /100 WBCS — SIGNIFICANT CHANGE UP (ref 0–0)
PLATELET # BLD AUTO: 147 K/UL — LOW (ref 150–400)
POTASSIUM SERPL-MCNC: 4.1 MMOL/L — SIGNIFICANT CHANGE UP (ref 3.5–5.3)
POTASSIUM SERPL-SCNC: 4.1 MMOL/L — SIGNIFICANT CHANGE UP (ref 3.5–5.3)
PROTHROM AB SERPL-ACNC: 22.5 SEC — HIGH (ref 10–12.9)
RBC # BLD: 3.86 M/UL — SIGNIFICANT CHANGE UP (ref 3.8–5.2)
RBC # FLD: 12.4 % — SIGNIFICANT CHANGE UP (ref 10.3–14.5)
SODIUM SERPL-SCNC: 141 MMOL/L — SIGNIFICANT CHANGE UP (ref 135–145)
WBC # BLD: 6.49 K/UL — SIGNIFICANT CHANGE UP (ref 3.8–10.5)
WBC # FLD AUTO: 6.49 K/UL — SIGNIFICANT CHANGE UP (ref 3.8–10.5)

## 2019-10-14 PROCEDURE — 84100 ASSAY OF PHOSPHORUS: CPT

## 2019-10-14 PROCEDURE — 83036 HEMOGLOBIN GLYCOSYLATED A1C: CPT

## 2019-10-14 PROCEDURE — 83735 ASSAY OF MAGNESIUM: CPT

## 2019-10-14 PROCEDURE — 80048 BASIC METABOLIC PNL TOTAL CA: CPT

## 2019-10-14 PROCEDURE — 82150 ASSAY OF AMYLASE: CPT

## 2019-10-14 PROCEDURE — 85610 PROTHROMBIN TIME: CPT

## 2019-10-14 PROCEDURE — 80053 COMPREHEN METABOLIC PANEL: CPT

## 2019-10-14 PROCEDURE — 82553 CREATINE MB FRACTION: CPT

## 2019-10-14 PROCEDURE — 93279 PRGRMG DEV EVAL PM/LDLS PM: CPT | Mod: 26

## 2019-10-14 PROCEDURE — 93880 EXTRACRANIAL BILAT STUDY: CPT

## 2019-10-14 PROCEDURE — 86900 BLOOD TYPING SEROLOGIC ABO: CPT

## 2019-10-14 PROCEDURE — 71045 X-RAY EXAM CHEST 1 VIEW: CPT

## 2019-10-14 PROCEDURE — 82550 ASSAY OF CK (CPK): CPT

## 2019-10-14 PROCEDURE — 99238 HOSP IP/OBS DSCHRG MGMT 30/<: CPT

## 2019-10-14 PROCEDURE — 87486 CHLMYD PNEUM DNA AMP PROBE: CPT

## 2019-10-14 PROCEDURE — 84484 ASSAY OF TROPONIN QUANT: CPT

## 2019-10-14 PROCEDURE — 97161 PT EVAL LOW COMPLEX 20 MIN: CPT

## 2019-10-14 PROCEDURE — 82962 GLUCOSE BLOOD TEST: CPT

## 2019-10-14 PROCEDURE — 85027 COMPLETE CBC AUTOMATED: CPT

## 2019-10-14 PROCEDURE — 80162 ASSAY OF DIGOXIN TOTAL: CPT

## 2019-10-14 PROCEDURE — 80061 LIPID PANEL: CPT

## 2019-10-14 PROCEDURE — 93306 TTE W/DOPPLER COMPLETE: CPT

## 2019-10-14 PROCEDURE — 85730 THROMBOPLASTIN TIME PARTIAL: CPT

## 2019-10-14 PROCEDURE — 86901 BLOOD TYPING SEROLOGIC RH(D): CPT

## 2019-10-14 PROCEDURE — 83690 ASSAY OF LIPASE: CPT

## 2019-10-14 PROCEDURE — 87633 RESP VIRUS 12-25 TARGETS: CPT

## 2019-10-14 PROCEDURE — 84443 ASSAY THYROID STIM HORMONE: CPT

## 2019-10-14 PROCEDURE — 93005 ELECTROCARDIOGRAM TRACING: CPT

## 2019-10-14 PROCEDURE — 86850 RBC ANTIBODY SCREEN: CPT

## 2019-10-14 PROCEDURE — 99233 SBSQ HOSP IP/OBS HIGH 50: CPT | Mod: GC

## 2019-10-14 PROCEDURE — 36415 COLL VENOUS BLD VENIPUNCTURE: CPT

## 2019-10-14 PROCEDURE — 74177 CT ABD & PELVIS W/CONTRAST: CPT

## 2019-10-14 PROCEDURE — 87798 DETECT AGENT NOS DNA AMP: CPT

## 2019-10-14 PROCEDURE — 87581 M.PNEUMON DNA AMP PROBE: CPT

## 2019-10-14 RX ORDER — METOPROLOL TARTRATE 50 MG
1 TABLET ORAL
Qty: 30 | Refills: 2
Start: 2019-10-14 | End: 2020-01-11

## 2019-10-14 RX ORDER — METOPROLOL TARTRATE 50 MG
25 TABLET ORAL DAILY
Refills: 0 | Status: DISCONTINUED | OUTPATIENT
Start: 2019-10-14 | End: 2019-10-14

## 2019-10-14 RX ORDER — DIGOXIN 250 MCG
1 TABLET ORAL
Qty: 0 | Refills: 0 | DISCHARGE

## 2019-10-14 RX ORDER — DILTIAZEM HCL 120 MG
1 CAPSULE, EXT RELEASE 24 HR ORAL
Qty: 30 | Refills: 0
Start: 2019-10-14 | End: 2019-11-12

## 2019-10-14 RX ORDER — DILTIAZEM HCL 120 MG
1 CAPSULE, EXT RELEASE 24 HR ORAL
Qty: 0 | Refills: 0 | DISCHARGE

## 2019-10-14 RX ADMIN — Medication 2: at 00:15

## 2019-10-14 RX ADMIN — Medication 180 MILLIGRAM(S): at 06:09

## 2019-10-14 RX ADMIN — PANTOPRAZOLE SODIUM 40 MILLIGRAM(S): 20 TABLET, DELAYED RELEASE ORAL at 06:09

## 2019-10-14 RX ADMIN — DORZOLAMIDE HYDROCHLORIDE 1 DROP(S): 20 SOLUTION/ DROPS OPHTHALMIC at 06:09

## 2019-10-14 RX ADMIN — DORZOLAMIDE HYDROCHLORIDE 1 DROP(S): 20 SOLUTION/ DROPS OPHTHALMIC at 18:22

## 2019-10-14 RX ADMIN — Medication 0.12 MILLIGRAM(S): at 06:08

## 2019-10-14 RX ADMIN — Medication 25 MILLIGRAM(S): at 11:42

## 2019-10-14 NOTE — PROGRESS NOTE ADULT - PROBLEM SELECTOR PLAN 2
epigastric pain assoc w/NBNB vomiting and diarrhea that has now resolved.   - remains afebrile w/ no leukocytosis.  RVP negative, CXR clear.   - Abd exam benign  - simethicone prn for bloating  CT scan of abdomen was reviewed with radiology and was unremarkable  - CT Abdomen/Pelvis with IV and Oral contrast ordered, patient will be NPO.

## 2019-10-14 NOTE — PROGRESS NOTE ADULT - PROBLEM SELECTOR PROBLEM 2
Epigastric abdominal pain

## 2019-10-14 NOTE — PHYSICAL THERAPY INITIAL EVALUATION ADULT - PERTINENT HX OF CURRENT PROBLEM, REHAB EVAL
88 year old female presented for acute epigastric pain associated with vomiting/diarrhea, chronic dry cough and chest tightness admitted to the CCU for monitoring now with resolution of symptoms and tolerating PO well; negative troponins x2, no acute changes on EKG, unconcerning bedside and formal echo with EF60%, now stepped down from CCU to [facility] for further monitoring.

## 2019-10-14 NOTE — DISCHARGE NOTE NURSING/CASE MANAGEMENT/SOCIAL WORK - NSDCFUADDAPPT_GEN_ALL_CORE_FT
Please follow up with Dr. Tavarez in 1 week. Please call her office and make an appointment to see her. It is very important that you follow up with Dr. Tavarez in 1week to have your INR checked  Please follow up with your Primary care provider in 1-2 week.

## 2019-10-14 NOTE — PROGRESS NOTE ADULT - PROBLEM SELECTOR PLAN 3
Afib, s/p pacemaker placed in 2016 by Dr. Moran ([phone]), family unsure exact reason of PM placement.  - Continue digoxin 0.125mg daily (level 0.6), and diltiazem 180mg daily  - INR was supratherapeutic @ 3.94 on admission, therapeutic this morning @ 3.  Will follow with attending recs for restarting.  - PPM interrogation with no acute findings

## 2019-10-14 NOTE — PROGRESS NOTE ADULT - PROBLEM SELECTOR PLAN 4
reported to have diabetes, not on any meds, only diet controlled, A1c 6.3 on this admission (10/12/19)  -ISS

## 2019-10-14 NOTE — CONSULT NOTE ADULT - ASSESSMENT
86-year-old female history of [facility] PPM, chronic afib (on coumadin), diet controlled diabetes mellitus who presents acute epigastric pain associated with vomiting/diarrhea, chronic dry cough and chest tightness initially admitted to CCU for monitoring subsequently stepped down to [facility]. Neurology consulted for dizziness.    Dizziness likely 2/2 BPPV  - Performed Eply maneuver with improvement in symptoms and gait instability  - No further intervention required from Neurology  - Neurology to sign, off please re-consult with any further questions      Plan discussed with Dr Overton

## 2019-10-14 NOTE — CONSULT NOTE ADULT - SUBJECTIVE AND OBJECTIVE BOX
**NEUROLOGY CONSULT NOTE**    Last known well time/Time of onset of symptoms:    HPI:    PAST MEDICAL & SURGICAL HISTORY:  Spinal stenosis  No significant past surgical history      FAMILY HISTORY:      SOCIAL HISTORY:  Smoking Cesation: Discussed    ROS:  Constitutional: No fever, weight loss or fatigue  Eyes: No eye pain, visual disturbances, or discharge  ENMT:  No difficulty hearing, tinnitus, vertigo; No sinus or throat pain  Neck: No pain or stiffness  Respiratory: No cough, wheezing, chills or hemoptysis  Cardiovascular: No chest pain, palpitations, shortness of breath, dizziness or leg swelling  Gastrointestinal: No abdominal pain. No nausea, vomiting or hematemesis; No diarrhea or constipation. Nohematochezia.  Genitourinary: No dysuria, frequency, hematuria or incontinence  Neurological: As per HPI  Skin: No itching, burning, rashes or lesions   Endocrine: No heat or cold intolerance; No hair loss  Musculoskeletal: No joint pain or swelling; No muscle, back or extremity pain  Psychiatric: No depression, anxiety, mood swings or difficulty sleeping  Heme/Lymph: No easy bruising or bleeding gums    MEDICATIONS  (STANDING):  dextrose 5%. 1000 milliLiter(s) (50 mL/Hr) IV Continuous <Continuous>  dextrose 50% Injectable 12.5 Gram(s) IV Push once  dextrose 50% Injectable 25 Gram(s) IV Push once  dextrose 50% Injectable 25 Gram(s) IV Push once  diltiazem    milliGRAM(s) Oral every 24 hours  dorzolamide 2% Ophthalmic Solution 1 Drop(s) Right EYE <User Schedule>  influenza   Vaccine 0.5 milliLiter(s) IntraMuscular once  insulin lispro (HumaLOG) corrective regimen sliding scale   SubCutaneous Before meals and at bedtime  metoprolol succinate ER 25 milliGRAM(s) Oral daily  pantoprazole    Tablet 40 milliGRAM(s) Oral before breakfast  sodium chloride 0.9%. 500 milliLiter(s) (100 mL/Hr) IV Continuous <Continuous>    MEDICATIONS  (PRN):  dextrose 40% Gel 15 Gram(s) Oral once PRN Blood Glucose LESS THAN 70 milliGRAM(s)/deciliter  glucagon  Injectable 1 milliGRAM(s) IntraMuscular once PRN Glucose LESS THAN 70 milligrams/deciliter      Allergies    codeine (Unknown)  latex (Rash)  morphine (Unknown)  Percocet 2.5/325 (Vomiting)    Intolerances        Vital Signs Last 24 Hrs  T(C): 36.4 (14 Oct 2019 08:35), Max: 36.7 (13 Oct 2019 23:57)  T(F): 97.6 (14 Oct 2019 08:35), Max: 98 (13 Oct 2019 23:57)  HR: 64 (14 Oct 2019 12:45) (64 - 71)  BP: 143/65 (14 Oct 2019 12:45) (128/53 - 143/65)  BP(mean): --  RR: 16 (14 Oct 2019 12:45) (16 - 18)  SpO2: 98% (14 Oct 2019 12:45) (95% - 98%)    PHYSICAL EXAM:  Constitutional: WDWN; NAD  Cardiovascular: RRR, no appreciable murmurs; no carotid bruits  Neurologic:  Mental status: Awake, alert and oriented x3.  Recent and remote memory intact.  Naming, repetition and comprehension intact.  Attention/concentration intact.  No dysarthria, no aphasia.  Fund of knowledge appropriate.    Cranial nerves: Pupils equally round and reactive to light, visual fields full, no nystagmus, extraocular muscles intact, V1 through V3 intact bilaterally and symmetric, face symmetric, hearing intact to finger rub, palate elevation symmetric, tongue was midline, sternocleidomastoid/shoulder shrug strength bilaterally 5/5.    Motor:  Normal bulk and tone, strength 5/5 in bilateral upper and lower extremities.   strength 5/5.  Rapid alternating movements intact and symmetric.   Sensation: Intact to light touch, proprioception, and pinprick.  No neglect.   Coordination: No dysmetria on finger-to-nose and heel-to-shin.  No clumsiness.  Reflexes: 2+ in upper and lower extremities, downgoing toes bilaterally  Gait: Narrow and steady. No ataxia.  Romberg negative    NIHSS:    Fingerstick Blood Glucose: CAPILLARY BLOOD GLUCOSE      POCT Blood Glucose.: 146 mg/dL (14 Oct 2019 11:03)       LABS:                        12.1   6.49  )-----------( 147      ( 14 Oct 2019 05:46 )             37.1     10-14    141  |  104  |  21  ----------------------------<  106<H>  4.1   |  27  |  0.58    Ca    8.9      14 Oct 2019 05:46  Mg     1.9     10-14      PT/INR - ( 14 Oct 2019 05:46 )   PT: 22.5 sec;   INR: 1.95          PTT - ( 14 Oct 2019 05:46 )  PTT:32.1 sec          RADIOLOGY & ADDITIONAL STUDIES: **NEUROLOGY CONSULT NOTE**      HPI:    86-year-old female history of spinal stenosis, hyperlipidemia, St Niraj PPM, chronic afib (on coumadin), diet controlled diabetes mellitus who presents for epigastric pain and discomfort; nonradiating epigastric 9/10 pain assoc with NBNB vomiting x4, diarrhea x2; admitted to CCU for monitoring and workup. Symptoms resolved once in the CCU. Trops negative x2, EKG not concerning for acute pathology, no longer having abd pain. For infectious workup - WBC wnl and patient afebrile while in CCU with no urinary symptoms. AM CXR clear and RVP negative. Pt endorsed some dizziness but was orthostatics negative. While in the CCU patient remained hemodynamically stable and is up for transfer to tele floor to 5U for further monitoring. s/p PM interrogation that showed no acute findings      PAST MEDICAL & SURGICAL HISTORY:  Spinal stenosis  No significant past surgical history      FAMILY HISTORY:      SOCIAL HISTORY:  Smoking Cesation: Discussed    ROS:  Constitutional: No fever, weight loss or fatigue  Eyes: No eye pain, visual disturbances, or discharge  ENMT:  No difficulty hearing, tinnitus, vertigo; No sinus or throat pain  Neck: No pain or stiffness  Respiratory: No cough, wheezing, chills or hemoptysis  Cardiovascular: No chest pain, palpitations, shortness of breath, dizziness or leg swelling  Gastrointestinal: No abdominal pain. No nausea, vomiting or hematemesis; No diarrhea or constipation. Nohematochezia.  Genitourinary: No dysuria, frequency, hematuria or incontinence  Neurological: As per HPI  Skin: No itching, burning, rashes or lesions   Endocrine: No heat or cold intolerance; No hair loss  Musculoskeletal: No joint pain or swelling; No muscle, back or extremity pain  Psychiatric: No depression, anxiety, mood swings or difficulty sleeping  Heme/Lymph: No easy bruising or bleeding gums    MEDICATIONS  (STANDING):  dextrose 5%. 1000 milliLiter(s) (50 mL/Hr) IV Continuous <Continuous>  dextrose 50% Injectable 12.5 Gram(s) IV Push once  dextrose 50% Injectable 25 Gram(s) IV Push once  dextrose 50% Injectable 25 Gram(s) IV Push once  diltiazem    milliGRAM(s) Oral every 24 hours  dorzolamide 2% Ophthalmic Solution 1 Drop(s) Right EYE <User Schedule>  influenza   Vaccine 0.5 milliLiter(s) IntraMuscular once  insulin lispro (HumaLOG) corrective regimen sliding scale   SubCutaneous Before meals and at bedtime  metoprolol succinate ER 25 milliGRAM(s) Oral daily  pantoprazole    Tablet 40 milliGRAM(s) Oral before breakfast  sodium chloride 0.9%. 500 milliLiter(s) (100 mL/Hr) IV Continuous <Continuous>    MEDICATIONS  (PRN):  dextrose 40% Gel 15 Gram(s) Oral once PRN Blood Glucose LESS THAN 70 milliGRAM(s)/deciliter  glucagon  Injectable 1 milliGRAM(s) IntraMuscular once PRN Glucose LESS THAN 70 milligrams/deciliter      Allergies    codeine (Unknown)  latex (Rash)  morphine (Unknown)  Percocet 2.5/325 (Vomiting)    Intolerances        Vital Signs Last 24 Hrs  T(C): 36.4 (14 Oct 2019 08:35), Max: 36.7 (13 Oct 2019 23:57)  T(F): 97.6 (14 Oct 2019 08:35), Max: 98 (13 Oct 2019 23:57)  HR: 64 (14 Oct 2019 12:45) (64 - 71)  BP: 143/65 (14 Oct 2019 12:45) (128/53 - 143/65)  BP(mean): --  RR: 16 (14 Oct 2019 12:45) (16 - 18)  SpO2: 98% (14 Oct 2019 12:45) (95% - 98%)    PHYSICAL EXAM:  Constitutional: WDWN; NAD  Cardiovascular: RRR, no appreciable murmurs; no carotid bruits  Neurologic:  Mental status: Awake, alert and oriented x3.  Recent and remote memory intact.  Naming, repetition and comprehension intact.  Attention/concentration intact.  No dysarthria, no aphasia.  Fund of knowledge appropriate.    Cranial nerves: Pupils equally round and reactive to light, visual fields full, no nystagmus, extraocular muscles intact, V1 through V3 intact bilaterally and symmetric, face symmetric, hearing intact to finger rub, palate elevation symmetric, tongue was midline, sternocleidomastoid/shoulder shrug strength bilaterally 5/5.    Motor:  Normal bulk and tone, strength 5/5 in bilateral upper and lower extremities.   strength 5/5.  Rapid alternating movements intact and symmetric.   Sensation: Intact to light touch, proprioception, and pinprick.  No neglect.   Coordination: No dysmetria on finger-to-nose and heel-to-shin.  No clumsiness.  Reflexes: 2+ in upper and lower extremities, downgoing toes bilaterally  Gait: Narrow and steady. No ataxia.  Romberg negative    NIHSS:    Fingerstick Blood Glucose: CAPILLARY BLOOD GLUCOSE      POCT Blood Glucose.: 146 mg/dL (14 Oct 2019 11:03)       LABS:                        12.1   6.49  )-----------( 147      ( 14 Oct 2019 05:46 )             37.1     10-14    141  |  104  |  21  ----------------------------<  106<H>  4.1   |  27  |  0.58    Ca    8.9      14 Oct 2019 05:46  Mg     1.9     10-14      PT/INR - ( 14 Oct 2019 05:46 )   PT: 22.5 sec;   INR: 1.95          PTT - ( 14 Oct 2019 05:46 )  PTT:32.1 sec          RADIOLOGY & ADDITIONAL STUDIES: **NEUROLOGY CONSULT NOTE**      HPI:    86-year-old female history of spinal stenosis, hyperlipidemia, St Niraj PPM, chronic afib (on coumadin), diet controlled diabetes mellitus who presents for epigastric pain and discomfort; nonradiating epigastric 9/10 pain assoc with NBNB vomiting x4, diarrhea x2; admitted to CCU for monitoring and workup. Symptoms resolved once in the CCU. Trops negative x2, EKG not concerning for acute pathology, no longer having abd pain. For infectious workup - WBC wnl and patient afebrile while in CCU with no urinary symptoms. AM CXR clear and RVP negative. Pt endorsed some dizziness but was orthostatics negative. While in the CCU patient remained hemodynamically stable and is up for transfer to tele floor to 5U for further monitoring. s/p PM interrogation that showed no acute findings. Patient subsequently transferred to [facility]. Neurology consulted for evaluation of dizziness.      PAST MEDICAL & SURGICAL HISTORY:  Spinal stenosis  No significant past surgical history      FAMILY HISTORY:      SOCIAL HISTORY:  Smoking Cesation: Discussed    ROS:  Constitutional: No fever, weight loss or fatigue  Eyes: No eye pain, visual disturbances, or discharge  ENMT:  No difficulty hearing, tinnitus, vertigo; No sinus or throat pain  Neck: No pain or stiffness  Respiratory: No cough, wheezing, chills or hemoptysis  Cardiovascular: No chest pain, palpitations, shortness of breath, dizziness or leg swelling  Gastrointestinal: No abdominal pain. No nausea, vomiting or hematemesis; No diarrhea or constipation. Nohematochezia.  Genitourinary: No dysuria, frequency, hematuria or incontinence  Neurological: As per HPI  Skin: No itching, burning, rashes or lesions   Endocrine: No heat or cold intolerance; No hair loss  Musculoskeletal: No joint pain or swelling; No muscle, back or extremity pain  Psychiatric: No depression, anxiety, mood swings or difficulty sleeping  Heme/Lymph: No easy bruising or bleeding gums    MEDICATIONS  (STANDING):  dextrose 5%. 1000 milliLiter(s) (50 mL/Hr) IV Continuous <Continuous>  dextrose 50% Injectable 12.5 Gram(s) IV Push once  dextrose 50% Injectable 25 Gram(s) IV Push once  dextrose 50% Injectable 25 Gram(s) IV Push once  diltiazem    milliGRAM(s) Oral every 24 hours  dorzolamide 2% Ophthalmic Solution 1 Drop(s) Right EYE <User Schedule>  influenza   Vaccine 0.5 milliLiter(s) IntraMuscular once  insulin lispro (HumaLOG) corrective regimen sliding scale   SubCutaneous Before meals and at bedtime  metoprolol succinate ER 25 milliGRAM(s) Oral daily  pantoprazole    Tablet 40 milliGRAM(s) Oral before breakfast  sodium chloride 0.9%. 500 milliLiter(s) (100 mL/Hr) IV Continuous <Continuous>    MEDICATIONS  (PRN):  dextrose 40% Gel 15 Gram(s) Oral once PRN Blood Glucose LESS THAN 70 milliGRAM(s)/deciliter  glucagon  Injectable 1 milliGRAM(s) IntraMuscular once PRN Glucose LESS THAN 70 milligrams/deciliter      Allergies    codeine (Unknown)  latex (Rash)  morphine (Unknown)  Percocet 2.5/325 (Vomiting)    Intolerances        Vital Signs Last 24 Hrs  T(C): 36.4 (14 Oct 2019 08:35), Max: 36.7 (13 Oct 2019 23:57)  T(F): 97.6 (14 Oct 2019 08:35), Max: 98 (13 Oct 2019 23:57)  HR: 64 (14 Oct 2019 12:45) (64 - 71)  BP: 143/65 (14 Oct 2019 12:45) (128/53 - 143/65)  BP(mean): --  RR: 16 (14 Oct 2019 12:45) (16 - 18)  SpO2: 98% (14 Oct 2019 12:45) (95% - 98%)    PHYSICAL EXAM:  Constitutional: WDWN; NAD  Cardiovascular: RRR, no appreciable murmurs; no carotid bruits  Neurologic:    - Mental Status:  AAOx3; speech is fluent with intact naming, repetition, and comprehension  - Cranial Nerves II-XII:    II:  PERRLA; visual fields are full to confrontation  III, IV, VI:  EOMI, no nystagmus  V:  facial sensation is intact in the V1-V3 distribution bilaterally.  VII:  face is symmetric with normal eye closure and smile  VIII:  hearing is intact to finger rub  IX, X:  uvula is midline and soft palate rises symmetrically  XI:  head turning and shoulder shrug are intact bilaterally  XII:  tongue protrudes in the midline  - Motor:  strength is 5/5 throughout; normal muscle bulk and tone throughout; no pronator drift  - Reflexes:  2+ and symmetric at the biceps, triceps, brachioradialis, knees, and ankles;  plantar reflexes downgoing bilaterally  - Sensory:  intact to light touch, pin prick, vibration, and joint-position sense throughout  - Coordination:  finger-nose-finger and heel-knee-shin intact without dysmetria; rapid alternating hand movements intact  - Gait:  normal steps, base, arm swing, and turning; heel and toe walking are normal; tandem gait is normal; Romberg testing is negative    	    Fingerstick Blood Glucose: CAPILLARY BLOOD GLUCOSE      POCT Blood Glucose.: 146 mg/dL (14 Oct 2019 11:03)       LABS:                        12.1   6.49  )-----------( 147      ( 14 Oct 2019 05:46 )             37.1     10-14    141  |  104  |  21  ----------------------------<  106<H>  4.1   |  27  |  0.58    Ca    8.9      14 Oct 2019 05:46  Mg     1.9     10-14      PT/INR - ( 14 Oct 2019 05:46 )   PT: 22.5 sec;   INR: 1.95          PTT - ( 14 Oct 2019 05:46 )  PTT:32.1 sec          RADIOLOGY & ADDITIONAL STUDIES:

## 2019-10-14 NOTE — DISCHARGE NOTE NURSING/CASE MANAGEMENT/SOCIAL WORK - NSDCPEPTCOWADIET_GEN_ALL_CORE
[Dear  ___] : Dear  [unfilled], [Courtesy Letter:] : I had the pleasure of seeing your patient, [unfilled], in my office today. [Please see my note below.] : Please see my note below. Keep your intake of Vitamin K Regular. The highest amount of vitamin K is found in green and leafy vegetables like broccoli, lettuces, cabbage and spinach. You can eat these foods however keep the amount the same as changes in the amount you eat can affect your INR blood tests. Contact your doctor before making any major changes in your diet.    Limit your alcohol intake. [Consult Closing:] : Thank you very much for allowing me to participate in the care of this patient.  If you have any questions, please do not hesitate to contact me. [Sincerely,] : Sincerely, [FreeTextEntry2] : Dr. M. Kleiner [FreeTextEntry3] : Dr. JOANN Del Rio

## 2019-10-14 NOTE — PROGRESS NOTE ADULT - REASON FOR ADMISSION
Chest/epigastric pain

## 2019-10-14 NOTE — PROGRESS NOTE ADULT - SUBJECTIVE AND OBJECTIVE BOX
Interval Events: Reviewed  Patient seen and examined at bedside.    Patient is a 88y old  Female who presents with a chief complaint of Chest/epigastric pain (14 Oct 2019 14:11)  she improved and the dizziness and abdominal discon=mfort improved    PAST MEDICAL & SURGICAL HISTORY:  Spinal stenosis  No significant past surgical history      MEDICATIONS:  Pulmonary:    Antimicrobials:    Anticoagulants:    Cardiac:  diltiazem    milliGRAM(s) Oral every 24 hours  metoprolol succinate ER 25 milliGRAM(s) Oral daily      Allergies    codeine (Unknown)  latex (Rash)  morphine (Unknown)  Percocet 2.5/325 (Vomiting)    Intolerances        Vital Signs Last 24 Hrs  T(C): 36.8 (14 Oct 2019 18:20), Max: 36.8 (14 Oct 2019 18:20)  T(F): 98.3 (14 Oct 2019 18:20), Max: 98.3 (14 Oct 2019 18:20)  HR: 64 (14 Oct 2019 12:45) (64 - 71)  BP: 143/65 (14 Oct 2019 12:45) (130/86 - 143/65)  BP(mean): --  RR: 16 (14 Oct 2019 12:45) (16 - 17)  SpO2: 98% (14 Oct 2019 12:45) (95% - 98%)    10-13 @ 07:01  -  10-14 @ 07:00  --------------------------------------------------------  IN: 1700 mL / OUT: 1200 mL / NET: 500 mL    10-14 @ 07:01  -  10-14 @ 21:43  --------------------------------------------------------  IN: 360 mL / OUT: 0 mL / NET: 360 mL          Review of Systems:   •	General: negative  •	Skin/Breast: negative  •	Ophthalmologic: negative  •	ENMT: negative  •	Respiratory and Thorax: negative  •	Cardiovascular: negative  •	Gastrointestinal: negative  •	Genitourinary: negative  •	Musculoskeletal: negative  •	Neurological: negative  •	Psychiatric: negative  •	Hematology/Lymphatics: negative  •	Endocrine: negative  •	Allergic/Immunologic: negative    Physical Exam:   • Constitutional:	Well-developed, well nourished  • Eyes:	EOMI; PERRL; no drainage or redness  • ENMT:	No oral lesions; no gross abnormalities  • Neck	No bruits; no thyromegaly or nodules  • Breasts:	not examined  • Back:	No deformity or limitation of movement  • Respiratory:	Breath Sounds equal & clear to percussion & auscultation, no accessory muscle use  • Cardiovascular:	Regular rate & rhythm, normal S1, S2; no murmurs, gallops or rubs; no S3, S4  • Gastrointestinal:	Soft, non-tender, no hepatosplenomegaly, normal bowel sounds  • Genitourinary:	not examined  • Rectal: not examined  • Extremities:	No cyanosis, clubbing or edema  • Vascular:	Equal and normal pulses (carotid, femoral, dorsalis pedis)  • Neurologica:l	not examined  • Skin:	No lesions; no rash  • Lymph Nodes:	No lymphadedenopathy  • Musculoskeletal:	No joint pain, swelling or deformity; no limitation of movement        LABS:      CBC Full  -  ( 14 Oct 2019 05:46 )  WBC Count : 6.49 K/uL  RBC Count : 3.86 M/uL  Hemoglobin : 12.1 g/dL  Hematocrit : 37.1 %  Platelet Count - Automated : 147 K/uL  Mean Cell Volume : 96.1 fl  Mean Cell Hemoglobin : 31.3 pg  Mean Cell Hemoglobin Concentration : 32.6 gm/dL  Auto Neutrophil # : x  Auto Lymphocyte # : x  Auto Monocyte # : x  Auto Eosinophil # : x  Auto Basophil # : x  Auto Neutrophil % : x  Auto Lymphocyte % : x  Auto Monocyte % : x  Auto Eosinophil % : x  Auto Basophil % : x    10-14    141  |  104  |  21  ----------------------------<  106<H>  4.1   |  27  |  0.58    Ca    8.9      14 Oct 2019 05:46  Mg     1.9     10-14      PT/INR - ( 14 Oct 2019 05:46 )   PT: 22.5 sec;   INR: 1.95          PTT - ( 14 Oct 2019 05:46 )  PTT:32.1 sec    < from: US Duplex Carotid Arteries Complete, Bilateral (10.13.19 @ 19:16) >  IMPRESSION:  1.  Plaque bilaterally without evidence of hemodynamically significant   stenosis.    2.  Multinodular thyroid.    < end of copied text >  < from: CT Abdomen and Pelvis w/ Oral Cont and w/ IV Cont (10.13.19 @ 18:48) >    Impression:  1.  Cholelithiasis.   2.  Compression fractures of L3 and L4 vertebral bodies, age   indeterminate, but new from prior study dated 7/21/2008.  3.  Dilated retrohepaticIVC and hepatic veins with a dilated right   atrium which may represent right heart obstruction.  4.  Additional findings as noted above.    < end of copied text >                  RADIOLOGY & ADDITIONAL STUDIES (The following images were personally reviewed):  Conner:                                     No  Urine output:                       adequate  DVT prophylaxis:                 Yes  Flattus:                                  Yes  Bowel movement:              No

## 2019-10-14 NOTE — DISCHARGE NOTE NURSING/CASE MANAGEMENT/SOCIAL WORK - PATIENT PORTAL LINK FT
You can access the FollowMyHealth Patient Portal offered by [facility] by registering at the following website: [email or web address]. By joining [employer]’s FollowMyHealth portal, you will also be able to view your health information using other applications (apps) compatible with our system.

## 2019-10-17 DIAGNOSIS — I07.1 RHEUMATIC TRICUSPID INSUFFICIENCY: ICD-10-CM

## 2019-10-17 DIAGNOSIS — E78.5 HYPERLIPIDEMIA, UNSPECIFIED: ICD-10-CM

## 2019-10-17 DIAGNOSIS — M48.00 SPINAL STENOSIS, SITE UNSPECIFIED: ICD-10-CM

## 2019-10-17 DIAGNOSIS — R10.13 EPIGASTRIC PAIN: ICD-10-CM

## 2019-10-17 DIAGNOSIS — H81.10 BENIGN PAROXYSMAL VERTIGO, UNSPECIFIED EAR: ICD-10-CM

## 2019-10-17 DIAGNOSIS — K52.9 NONINFECTIVE GASTROENTERITIS AND COLITIS, UNSPECIFIED: ICD-10-CM

## 2019-10-17 DIAGNOSIS — R79.0 ABNORMAL LEVEL OF BLOOD MINERAL: ICD-10-CM

## 2019-10-17 DIAGNOSIS — I95.1 ORTHOSTATIC HYPOTENSION: ICD-10-CM

## 2019-10-17 DIAGNOSIS — I48.20 CHRONIC ATRIAL FIBRILLATION, UNSPECIFIED: ICD-10-CM

## 2019-10-17 DIAGNOSIS — E11.9 TYPE 2 DIABETES MELLITUS WITHOUT COMPLICATIONS: ICD-10-CM

## 2019-10-17 DIAGNOSIS — H40.9 UNSPECIFIED GLAUCOMA: ICD-10-CM

## 2019-10-17 DIAGNOSIS — Z95.0 PRESENCE OF CARDIAC PACEMAKER: ICD-10-CM

## 2019-10-17 DIAGNOSIS — Z79.01 LONG TERM (CURRENT) USE OF ANTICOAGULANTS: ICD-10-CM

## 2019-10-23 NOTE — PHYSICAL THERAPY INITIAL EVALUATION ADULT - GAIT DISTANCE, PT EVAL
HISTORY OF PRESENT ILLNESS:  Chief Complaint   Patient presents with   • Transitional Care Management     , discharged from [facility] on 10/18      HPI     Yesi is here for hospital follow up. She was hospitalized at [facility] from 10/5/19 - 10/18/19. She developed nausea and sudden onset headache on 10/5/19. She went to the ER and a CT Head showed a large subarachnoid hematoma with blood in the 4th ventricle.  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 The Basilar artery aneurysm and one of the left ICA aneurysms were treated with coil embolization. She has follow up with her neurosurgeon set up in January.    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 She follows with Cardiologist Dr. Huffman. She was previously on Losartan for hypertension control, this was held in the hospital and she remained normotensive. Mom was instructed to continue to hold the medication at home and monitor her BP. Since getting home her blood pressure has been consistently elevated.    She was hypokalemic in the hospital. She was started on a daily potassium supplement. Mom states that Yesi has been refusing to take the potassium supplement at home.    PAST MEDICAL, FAMILY AND SOCIAL HISTORIES:   The following histories were personally reviewed with the patient and updated.  Current medications, Allergies, Problem list, Past Medical History and Past Surgical History    REVIEW OF SYSTEMS:  Review of Systems   Musculoskeletal: Negative for neck pain and stiffness.   Gastrointestinal: Negative for nausea and vomiting.   Neurological: Negative for dizziness and headaches.     PHYSICAL EXAM:  Visit Vitals  BP (!) 139/96 (BP Location: RUE - Right upper extremity, Patient Position: Sitting, Cuff Size: Regular)   Pulse 101   Temp 98.7 °F (37.1 °C) (Tympanic)   Ht 4' 11\" (1.499 m)   Wt 73.5 kg   LMP 08/30/2019 (Exact Date)   BMI 32.72 kg/m²      Physical Exam   Constitutional: She is  oriented to person, place, and time and well-developed, well-nourished, and in no distress.   HENT:   Head: Normocephalic and atraumatic.   Eyes: Conjunctivae are normal.   Cardiovascular: Normal rate and regular rhythm.   Murmur heard.   Systolic murmur is present.  Pulmonary/Chest: Effort normal and breath sounds normal. No respiratory distress. She has no wheezes. She has no rales. Musculoskeletal:         General: No edema.     Neurological: She is alert and oriented to person, place, and time.   Skin: Skin is warm and dry.   Nursing note and vitals reviewed.    ASSESSMENT AND PLAN:  1. Subarachnoid hemorrhage (CMS/HCC)  2. Intracranial aneurysm  -she is s/p coil embolization of a basilar artery aneurysm and left ICA aneurysm; she still has a remaining left ICA aneurysm that will be addressed by her Neurosurgeon at her follow up appointment in January 2020    3. Other secondary hypertension  -BP is running high at home since discharge, restart on Losartan 25 mg daily  -she has a follow up appointment with her Cardiologist scheduled for next month    4. Hypokalemia  -Yesi is refusing to take the liquid potassium supplement  -will recheck a BMP today, if she is still hypokalemic could change the prescription to a potassium chloride tablet  - BASIC METABOLIC PANEL; Future  - MAGNESIUM LEVEL; Future    She will restart on her oral birth control pill. I advised mom that she may experience some breakthrough bleeding during the first few months as she adjusts to being back on the birth control pill.   x2/100 feet

## 2020-02-13 NOTE — PROGRESS NOTE ADULT - PROBLEM SELECTOR PLAN 1
epigastric/Chest discomfort resolved. likely 2/2 Gastroenteritis  - EKG with new Twave inversions in lateral leads compared to 2018 EKG  - CE negative x2  - Echo grossly normal with EF 60%; unlikely ischemic cause.  - Device interrogated yesterday, no acute findings. no

## 2020-09-18 ENCOUNTER — INPATIENT (INPATIENT)
Facility: HOSPITAL | Age: 85
LOS: 1 days | Discharge: HOME | DRG: 247 | End: 2020-09-20
Attending: INTERNAL MEDICINE | Admitting: INTERNAL MEDICINE
Payer: MEDICARE

## 2020-09-18 VITALS
TEMPERATURE: 97 F | WEIGHT: 149.91 LBS | OXYGEN SATURATION: 96 % | DIASTOLIC BLOOD PRESSURE: 80 MMHG | HEIGHT: 70 IN | HEART RATE: 84 BPM | RESPIRATION RATE: 18 BRPM | SYSTOLIC BLOOD PRESSURE: 138 MMHG

## 2020-09-18 DIAGNOSIS — I48.91 UNSPECIFIED ATRIAL FIBRILLATION: ICD-10-CM

## 2020-09-18 DIAGNOSIS — R07.9 CHEST PAIN, UNSPECIFIED: ICD-10-CM

## 2020-09-18 DIAGNOSIS — K21.9 GASTRO-ESOPHAGEAL REFLUX DISEASE WITHOUT ESOPHAGITIS: ICD-10-CM

## 2020-09-18 LAB
ALBUMIN SERPL ELPH-MCNC: 4.3 G/DL — SIGNIFICANT CHANGE UP (ref 3.3–5)
ALP SERPL-CCNC: 66 U/L — SIGNIFICANT CHANGE UP (ref 40–120)
ALT FLD-CCNC: 12 U/L — SIGNIFICANT CHANGE UP (ref 10–45)
ANION GAP SERPL CALC-SCNC: 13 MMOL/L — SIGNIFICANT CHANGE UP (ref 5–17)
APTT BLD: 29.8 SEC — SIGNIFICANT CHANGE UP (ref 27.5–35.5)
AST SERPL-CCNC: 18 U/L — SIGNIFICANT CHANGE UP (ref 10–40)
BASOPHILS # BLD AUTO: 0.03 K/UL — SIGNIFICANT CHANGE UP (ref 0–0.2)
BASOPHILS NFR BLD AUTO: 0.5 % — SIGNIFICANT CHANGE UP (ref 0–2)
BILIRUB SERPL-MCNC: 0.4 MG/DL — SIGNIFICANT CHANGE UP (ref 0.2–1.2)
BUN SERPL-MCNC: 19 MG/DL — SIGNIFICANT CHANGE UP (ref 7–23)
CALCIUM SERPL-MCNC: 9.4 MG/DL — SIGNIFICANT CHANGE UP (ref 8.4–10.5)
CHLORIDE SERPL-SCNC: 103 MMOL/L — SIGNIFICANT CHANGE UP (ref 96–108)
CK MB CFR SERPL CALC: 2.3 NG/ML — SIGNIFICANT CHANGE UP (ref 0–6.7)
CK SERPL-CCNC: 54 U/L — SIGNIFICANT CHANGE UP (ref 25–170)
CO2 SERPL-SCNC: 24 MMOL/L — SIGNIFICANT CHANGE UP (ref 22–31)
CREAT SERPL-MCNC: 0.59 MG/DL — SIGNIFICANT CHANGE UP (ref 0.5–1.3)
EOSINOPHIL # BLD AUTO: 0.15 K/UL — SIGNIFICANT CHANGE UP (ref 0–0.5)
EOSINOPHIL NFR BLD AUTO: 2.5 % — SIGNIFICANT CHANGE UP (ref 0–6)
GLUCOSE SERPL-MCNC: 280 MG/DL — HIGH (ref 70–99)
HCT VFR BLD CALC: 38.3 % — SIGNIFICANT CHANGE UP (ref 34.5–45)
HGB BLD-MCNC: 12.2 G/DL — SIGNIFICANT CHANGE UP (ref 11.5–15.5)
IMM GRANULOCYTES NFR BLD AUTO: 0.2 % — SIGNIFICANT CHANGE UP (ref 0–1.5)
INR BLD: 1.14 — SIGNIFICANT CHANGE UP (ref 0.88–1.16)
LIDOCAIN IGE QN: 25 U/L — SIGNIFICANT CHANGE UP (ref 7–60)
LYMPHOCYTES # BLD AUTO: 1.6 K/UL — SIGNIFICANT CHANGE UP (ref 1–3.3)
LYMPHOCYTES # BLD AUTO: 26.5 % — SIGNIFICANT CHANGE UP (ref 13–44)
MCHC RBC-ENTMCNC: 30.7 PG — SIGNIFICANT CHANGE UP (ref 27–34)
MCHC RBC-ENTMCNC: 31.9 GM/DL — LOW (ref 32–36)
MCV RBC AUTO: 96.5 FL — SIGNIFICANT CHANGE UP (ref 80–100)
MONOCYTES # BLD AUTO: 0.57 K/UL — SIGNIFICANT CHANGE UP (ref 0–0.9)
MONOCYTES NFR BLD AUTO: 9.4 % — SIGNIFICANT CHANGE UP (ref 2–14)
NEUTROPHILS # BLD AUTO: 3.68 K/UL — SIGNIFICANT CHANGE UP (ref 1.8–7.4)
NEUTROPHILS NFR BLD AUTO: 60.9 % — SIGNIFICANT CHANGE UP (ref 43–77)
NRBC # BLD: 0 /100 WBCS — SIGNIFICANT CHANGE UP (ref 0–0)
PLATELET # BLD AUTO: 145 K/UL — LOW (ref 150–400)
POTASSIUM SERPL-MCNC: 4 MMOL/L — SIGNIFICANT CHANGE UP (ref 3.5–5.3)
POTASSIUM SERPL-SCNC: 4 MMOL/L — SIGNIFICANT CHANGE UP (ref 3.5–5.3)
PROT SERPL-MCNC: 6.9 G/DL — SIGNIFICANT CHANGE UP (ref 6–8.3)
PROTHROM AB SERPL-ACNC: 13.6 SEC — SIGNIFICANT CHANGE UP (ref 10.6–13.6)
RBC # BLD: 3.97 M/UL — SIGNIFICANT CHANGE UP (ref 3.8–5.2)
RBC # FLD: 12.4 % — SIGNIFICANT CHANGE UP (ref 10.3–14.5)
SODIUM SERPL-SCNC: 140 MMOL/L — SIGNIFICANT CHANGE UP (ref 135–145)
TROPONIN T SERPL-MCNC: <0.01 NG/ML — SIGNIFICANT CHANGE UP (ref 0–0.01)
WBC # BLD: 6.04 K/UL — SIGNIFICANT CHANGE UP (ref 3.8–10.5)
WBC # FLD AUTO: 6.04 K/UL — SIGNIFICANT CHANGE UP (ref 3.8–10.5)

## 2020-09-18 PROCEDURE — 93010 ELECTROCARDIOGRAM REPORT: CPT

## 2020-09-18 PROCEDURE — 99285 EMERGENCY DEPT VISIT HI MDM: CPT

## 2020-09-18 PROCEDURE — 71045 X-RAY EXAM CHEST 1 VIEW: CPT | Mod: 26

## 2020-09-18 RX ORDER — HEPARIN SODIUM 5000 [USP'U]/ML
INJECTION INTRAVENOUS; SUBCUTANEOUS
Qty: 25000 | Refills: 0 | Status: DISCONTINUED | OUTPATIENT
Start: 2020-09-18 | End: 2020-09-19

## 2020-09-18 RX ORDER — DEXTROSE 50 % IN WATER 50 %
12.5 SYRINGE (ML) INTRAVENOUS ONCE
Refills: 0 | Status: DISCONTINUED | OUTPATIENT
Start: 2020-09-18 | End: 2020-09-20

## 2020-09-18 RX ORDER — DEXTROSE 50 % IN WATER 50 %
25 SYRINGE (ML) INTRAVENOUS ONCE
Refills: 0 | Status: DISCONTINUED | OUTPATIENT
Start: 2020-09-18 | End: 2020-09-20

## 2020-09-18 RX ORDER — INSULIN LISPRO 100/ML
VIAL (ML) SUBCUTANEOUS
Refills: 0 | Status: DISCONTINUED | OUTPATIENT
Start: 2020-09-18 | End: 2020-09-20

## 2020-09-18 RX ORDER — METOPROLOL TARTRATE 50 MG
25 TABLET ORAL DAILY
Refills: 0 | Status: DISCONTINUED | OUTPATIENT
Start: 2020-09-18 | End: 2020-09-20

## 2020-09-18 RX ORDER — LIDOCAINE 4 G/100G
10 CREAM TOPICAL ONCE
Refills: 0 | Status: COMPLETED | OUTPATIENT
Start: 2020-09-18 | End: 2020-09-18

## 2020-09-18 RX ORDER — DILTIAZEM HCL 120 MG
180 CAPSULE, EXT RELEASE 24 HR ORAL DAILY
Refills: 0 | Status: DISCONTINUED | OUTPATIENT
Start: 2020-09-18 | End: 2020-09-20

## 2020-09-18 RX ORDER — GLUCAGON INJECTION, SOLUTION 0.5 MG/.1ML
1 INJECTION, SOLUTION SUBCUTANEOUS ONCE
Refills: 0 | Status: DISCONTINUED | OUTPATIENT
Start: 2020-09-18 | End: 2020-09-20

## 2020-09-18 RX ORDER — HEPARIN SODIUM 5000 [USP'U]/ML
2500 INJECTION INTRAVENOUS; SUBCUTANEOUS EVERY 6 HOURS
Refills: 0 | Status: DISCONTINUED | OUTPATIENT
Start: 2020-09-18 | End: 2020-09-19

## 2020-09-18 RX ORDER — WARFARIN SODIUM 2.5 MG/1
3 TABLET ORAL ONCE
Refills: 0 | Status: COMPLETED | OUTPATIENT
Start: 2020-09-18 | End: 2020-09-18

## 2020-09-18 RX ORDER — ONDANSETRON 8 MG/1
4 TABLET, FILM COATED ORAL ONCE
Refills: 0 | Status: COMPLETED | OUTPATIENT
Start: 2020-09-18 | End: 2020-09-18

## 2020-09-18 RX ORDER — DEXTROSE 50 % IN WATER 50 %
15 SYRINGE (ML) INTRAVENOUS ONCE
Refills: 0 | Status: DISCONTINUED | OUTPATIENT
Start: 2020-09-18 | End: 2020-09-20

## 2020-09-18 RX ORDER — PANTOPRAZOLE SODIUM 20 MG/1
40 TABLET, DELAYED RELEASE ORAL
Refills: 0 | Status: DISCONTINUED | OUTPATIENT
Start: 2020-09-18 | End: 2020-09-20

## 2020-09-18 RX ORDER — SODIUM CHLORIDE 9 MG/ML
1000 INJECTION, SOLUTION INTRAVENOUS
Refills: 0 | Status: DISCONTINUED | OUTPATIENT
Start: 2020-09-18 | End: 2020-09-20

## 2020-09-18 RX ORDER — PANTOPRAZOLE SODIUM 20 MG/1
40 TABLET, DELAYED RELEASE ORAL ONCE
Refills: 0 | Status: COMPLETED | OUTPATIENT
Start: 2020-09-18 | End: 2020-09-18

## 2020-09-18 RX ORDER — HEPARIN SODIUM 5000 [USP'U]/ML
5500 INJECTION INTRAVENOUS; SUBCUTANEOUS EVERY 6 HOURS
Refills: 0 | Status: DISCONTINUED | OUTPATIENT
Start: 2020-09-18 | End: 2020-09-19

## 2020-09-18 RX ORDER — WARFARIN SODIUM 2.5 MG/1
1 TABLET ORAL
Qty: 0 | Refills: 0 | DISCHARGE

## 2020-09-18 RX ADMIN — Medication 30 MILLILITER(S): at 19:57

## 2020-09-18 RX ADMIN — PANTOPRAZOLE SODIUM 40 MILLIGRAM(S): 20 TABLET, DELAYED RELEASE ORAL at 19:57

## 2020-09-18 RX ADMIN — HEPARIN SODIUM 1200 UNIT(S)/HR: 5000 INJECTION INTRAVENOUS; SUBCUTANEOUS at 23:07

## 2020-09-18 RX ADMIN — LIDOCAINE 10 MILLILITER(S): 4 CREAM TOPICAL at 19:58

## 2020-09-18 RX ADMIN — WARFARIN SODIUM 3 MILLIGRAM(S): 2.5 TABLET ORAL at 23:46

## 2020-09-18 RX ADMIN — ONDANSETRON 4 MILLIGRAM(S): 8 TABLET, FILM COATED ORAL at 19:57

## 2020-09-18 NOTE — ED ADULT NURSE NOTE - OBJECTIVE STATEMENT
Received pt awake alert and oriented x 3. MEJIA. Pt presents to the ED C/O epigastric pain, burping and nausea. Hx of hiatal hernia. Vss,a febrile. will continue to monitor.

## 2020-09-18 NOTE — ED PROVIDER NOTE - CROS ED CARDIOVAS ALL NEG
Symptomatic treatment:    Alternate Tylenol and Ibuprofen every 3 hrs for fever, pain and inflammation. Avoid Nsaids if you are pregnant or have advanced kidney disease.     salt water gargles to soothe throat from post nasal drip  Honey/lemon water or warm tea to soothe throat from post nasal drip  Cepachol helps to soothe the discomfort in throat from post nasal drip    Cold-eeze helps to reduce the duration of URI symptoms if taken early  Elderberry to reduce duration of viral URI symptoms    Nasal saline spray reduces inflammation and dryness  Warm face compresses/hot showers as often as you can to open sinuses and allow to drain.   Flonase OTC or Nasacort OTC to help decrease inflammation in nasal turbinates and allow sinuses to drain    Vicks vapor rub at night  Simple foods like chicken noodle soup help provide hydration and nutrition    Delsym helps with coughing at night    Zantac will help if there is reflux from the post nasal drip and helpful to take at night    Zyrtec/Claritin during the day and Benadryl at night may help if allergy component concurrently with URI    Rest as much as you can    Your symptoms will likely last 5-7 days, maybe longer depending on how it affects your body.  You are contagious 5-7, so minimize contact with others to reduce the spread to others and stay home from work or school as we discussed. Dehydration is preventable but is one of the main reasons why you will feel so badly. Drink pedialyte, gatorade or propel. Stay hydrated.  Antibiotics are not needed unless a complication(such as Otitis Media, Bacterial sinus infection or pneumonia) develops. Taking antibiotics for Flu/Cold is not supported by evidence-based medicine and can expose you to unnecessary side effects of the medication, such as anaphylaxis, yeast infection and leads to antibiotic resistance.   If you experience any:  Chest pain, shortness of breath, wheezing or difficulty breathing,  Severe headache, face,  neck or ear pain,  New rash,  Fever over 101.5º F (38.6 C) for more than three days,  Confusion, behavior change or seizure,  Severe weakness or dizziness, please go to the ER immediately for further testing.                - - -

## 2020-09-18 NOTE — H&P ADULT - PROBLEM SELECTOR PLAN 1
prior episode of SSCP resolved, currently c/o reproducible right upper chest pain. EKG Afib@75BPM with TWI V1-V2, AVL. CXR unremarkable. Labs notable for: Cardiac enzymes negative x 1 set, .  --will F/U cardiac enzymes@12AM and 5AM.  --obtain Echo in AM. prior episode of SSCP resolved, currently c/o reproducible right upper chest pain. EKG Afib@75BPM with TWI V1-V2, AVL. CXR unremarkable. Labs notable for: Cardiac enzymes negative x 1 set, .  --will F/U cardiac enzymes@12AM and 5AM.  --obtain Echo in AM.    **Prior Echo 10/12/19 revealed LVH, normal LV size and wall motion, EF:60%. Normal RV size and function. Mild to moderate AR, mild MR, severe TR, PASP 38.3mmHg.

## 2020-09-18 NOTE — H&P ADULT - HISTORY OF PRESENT ILLNESS
89 yr old F, R eye blindness, PMH of HTN, hyperlipidemia, diet controlled DM, GERD, Afib s/p [facility] Niraj’s PPM (on Coumadin), known severe TR, spinal stenosis who was brought in by daughter in law  Surinder to [facility] ED on 9/18/20 c/o chest pain.   Patient describes it as being substernal chest burning, 7/10 in severity, associated with diaphoresis.   Prior Echo 10/12/19 revealed LVH, normal LV size and wall motion, EF:60%.    1. Left ventricular hypertrophy. Normal LV size and wall motion. The   estimated LV ejection fraction is 60%.   2. Probably normal right ventricular size and systolic function. Device   leads are noted in the right heart.   3. Aortic valve thickening. Mild to moderate aortic regurgitation. No   hemodynamically significant aortic stenosis.   4. Mitral annular calcification. Mitral valve thickening. Mild mitral   regurgitation.   5. Severe tricuspid regurgitation. Pulmonary artery systolic pressure   (estimated using the tricuspid regurgitant gradient and an estimate of   right atrial pressure) is 38.3 mmHg.   6. The pulmonic valve is not well visualized. Mild pulmonic   regurgitation.   7. No pericardial effusion is noted.   8. Aortic root sclerosis.   9. The inferior vena cava is dilated (>2.1cm) with normal inspiratory   collapse (>50%) consistent with mildly elevated right atrial pressure (  8, range 5-10mmHg).    In ED, BP: 138/80, HR:84, RR:18, Temp: 97.4F, O2 sat: 96% RA.   EKG revealed Afib@75BPM with TWI V1-V2, AVL. CXR unremarkable. Labs notable for: Cardiac enzymes negative x 1 set, .   Patient treated with Maalox 30mL PO x 1 dose, Zofran 4mg IV x 1 dose and Protonix 40mg IV x 1 dose.     Patient currently stable, admitted to [facility] for cardiac telemetry, serial cardiac enzymes and further cardiac work-up.    89 yr old F, R eye blindness, PMH of HTN, hyperlipidemia, diet controlled DM, GERD, Afib s/p [facility] Nir[employer]s PPM (on Coumadin), known severe TR, spinal stenosis who was brought in by daughter in law  Surinder to [facility] ED on 9/18/20 c/o chest pain x few hours.   Patient describes it as being substernal chest burning, 7/10 in severity, associated with diaphoresis.   Prior Echo 10/12/19 revealed LVH, normal LV size and wall motion, EF:60%. Normal RV size and function. Mild to moderate AR, mild MR, severe TR, PASP 38.3mmHg.   In ED, BP: 138/80, HR:84, RR:18, Temp: 97.4F, O2 sat: 96% RA. EKG revealed Afib@75BPM with TWI V1-V2, AVL. CXR unremarkable. Labs notable for: Cardiac enzymes negative x 1 set, .   Patient treated with Maalox 30mL PO x 1 dose, Zofran 4mg IV x 1 dose and Protonix 40mg IV x 1 dose.     Patient currently stable, admitted to [facility] for cardiac telemetry, serial cardiac enzymes and further cardiac work-up.    89 yr old F, right eye blindness, PMH of HTN, hyperlipidemia, diet controlled DM, GERD, Afib s/p [facility] Nir[employer]’s PPM (on Coumadin), known severe TR (by Echo 10/2019), spinal stenosis who was brought in by daughter in law Dr. Cadena to [facility] ED on 9/18/20 c/o an episode of chest pain. Patient reports ~6:45PM, patient had acute onset substernal chest pressure/burning,  7/10 in severity, associated with diaphoresis and nausea. Symptoms lasted ~20 minutes prior to subsiding. Patient denies SOB, palpitations, dizziness, PND, orthopnea, LE edema, recent travel or sick contacts. Patient admits to occasional noncompliance with medications.   In ED, BP: 138/80, HR:84, RR:18, Temp: 97.4F, O2 sat: 96% RA. EKG revealed Afib@75BPM with TWI V1-V2, AVL. CXR unremarkable. Labs notable for: Cardiac enzymes negative x 1 set, . Patient treated with Maalox 30mL PO x 1 dose, Zofran 4mg IV x 1 dose and Protonix 40mg IV x 1 dose.   Patient currently stable, admitted to [facility] for cardiac telemetry, serial cardiac enzymes and further cardiac work-up.    89 yr old F, right eye blindness, with IODINE ALLERGY (rxn: hives), PMH of HTN, diet controlled DM, GERD, Afib s/p [facility] Nir[employer]s PPM (on Coumadin), known severe TR (by Echo 10/2019), spinal stenosis who was brought in by daughter in law Dr. Cadena to [facility] ED on 9/18/20 c/o an episode of chest pain. Patient reports ~6:45PM, patient had acute onset substernal chest pressure/burning,  7/10 in severity, associated with diaphoresis and nausea. Symptoms lasted ~20 minutes prior to subsiding. Patient denies SOB, palpitations, dizziness, PND, orthopnea, LE edema, recent travel or sick contacts. Patient admits to occasional noncompliance with medications. Per patients daughter in law, patient has been active making large meals recently and may have over exerted herself. Patients ET has steadily declined over the past year, previously able to ambulate 1 block to now 20-30 feet within her home.  In ED, BP: 138/80, HR:84, RR:18, Temp: 97.4F, O2 sat: 96% RA. EKG revealed Afib@75BPM with TWI V1-V2, AVL. CXR unremarkable. Labs notable for: Cardiac enzymes negative x 1 set, . Patient treated with Maalox 30mL PO x 1 dose, Zofran 4mg IV x 1 dose and Protonix 40mg IV x 1 dose.   Patient currently stable, admitted to [facility] for cardiac telemetry, serial cardiac enzymes and further cardiac work-up.

## 2020-09-18 NOTE — ED PROVIDER NOTE - CLINICAL SUMMARY MEDICAL DECISION MAKING FREE TEXT BOX
89F with a h/o GERD, HTN, afib on coumadin, PM who p/w CP associated with heart burn pta. Pt's daughter in law,  Lacey, noticed the patient to be diaphoretic and clutching her chest when she picked her up. Pt states pain feels like pressure and heart burn, 10/10. Nonradiating,   VSS, EKG afib, TWI lateral, no stemi, pt given GI cocktail zofran and protonix with some releif, but still with pain, labs with neg trop x 1, mildly elevated probnp and glu 200s, will admit to cards for further w/u of CP and r/o ACS. Clinicall picture not c/w PE or dissection, pt with allergy to IV contrast as well.

## 2020-09-18 NOTE — ED PROVIDER NOTE - OBJECTIVE STATEMENT
89F with a h/o GERD, HTN, afib on coumadin, PM who p/w CP associated with heart burn pta. Pt's daughter in law, Dr Rahman, noticed the patient to be diaphoretic and clutching her chest when she picked her up. Pt states pain feels like 89F with a h/o GERD, HTN, afib on coumadin, PM who p/w CP associated with heart burn pta. Pt's daughter in law,  Lacey, noticed the patient to be diaphoretic and clutching her chest when she picked her up. Pt states pain feels like pressure and heart burn, 10/10. Nonradiating, pt states it feels like her stomach but worse tonight. DIL states pt may have overexerted herself and been eating too much lately, she eats lots of bread, etc. No f/c, no sob, no n/v, no abd pain, no ha or neck pain, no dizziness or syncope, no n/t/w in ext. No other complaints.

## 2020-09-18 NOTE — ED ADULT NURSE NOTE - NSIMPLEMENTINTERV_GEN_ALL_ED
Implemented All Universal Safety Interventions:  [place] to call system. Call bell, personal items and telephone within reach. Instruct patient to call for assistance. Room bathroom lighting operational. Non-slip footwear when patient is off stretcher. Physically safe environment: no spills, clutter or unnecessary equipment. Stretcher in lowest position, wheels locked, appropriate side rails in place.

## 2020-09-18 NOTE — H&P ADULT - NSICDXPASTMEDICALHX_GEN_ALL_CORE_FT
PAST MEDICAL HISTORY:  Spinal stenosis      PAST MEDICAL HISTORY:  Atrial fibrillation     GERD (gastroesophageal reflux disease)     Spinal stenosis

## 2020-09-18 NOTE — H&P ADULT - ASSESSMENT
Prior Echo 10/12/19 revealed LVH, normal LV size and wall motion, EF:60%. Normal RV size and function. Mild to moderate AR, mild MR, severe TR, PASP 38.3mmHg. 89 yr old F, right eye blindness, with IODINE ALLERGY (rxn: hives), PMH of HTN, diet controlled DM, GERD, Afib s/p [facility] PPM, severe TR presents to [facility] ED on 9/18/20 c/o an episode of acute onset substernal chest pressure/burning,  7/10 in severity, associated with diaphoresis and nausea.  EKG revealed Afib@75BPM with TWI V1-V2, AVL. CXR unremarkable. Labs notable for: Cardiac enzymes negative x 1 set, . Patient treated with Maalox 30mL PO x 1 dose, Zofran 4mg IV x 1 dose and Protonix 40mg IV x 1 dose.   Patient currently stable, admitted to [facility] for cardiac telemetry, serial cardiac enzymes and further cardiac work-up.

## 2020-09-18 NOTE — H&P ADULT - PROBLEM SELECTOR PLAN 2
rate controlled with HR 80s, continue Diltiazem 180mg PO daily and Toprol XL 25mg PO daily.   -INR 1.14, subtherapeutic, will dose home Coumadin 3mg PO tonight and initiate Heparin gtt without bolus.

## 2020-09-18 NOTE — H&P ADULT - ATTENDING COMMENTS
See PA note written above, for details. I reviewed the PA documentation.  I have personally seen and examined this patient 9/19/20 on morning rounds. I reviewed vitals, labs, medications, cardiac studies and additional imaging.  I agree with the PA's findings and plans as written above with the following additions/amendments:  Patient with CP and positive troponin, r/i NSTEMI.   Cath lab notified. Patient to go for [facility] with Dr. JOY Harris today 9/19/20  Keep NPO except for medications  Heparin gtt running on ACS protocol  RYAN Almaguer.  Cardiology Attending See PA note written above, for details. I reviewed the PA documentation.  I have personally seen and examined this patient 9/19/20 on morning rounds. I reviewed vitals, labs, medications, cardiac studies and additional imaging.  I agree with the PA's findings and plans as written above with the following additions/amendments:  Patient with CP and positive troponin, r/i NSTEMI.   Cath lab notified. Patient to go for LHC with Dr. JOY Harris today 9/19/20  Patient to receive allergy prep prior to LHC (anti histamines and steroids) given iodine allergy  Keep NPO except for medications  Heparin gtt running on ACS protocol  RYAN Almaguer.  Cardiology Attending

## 2020-09-19 DIAGNOSIS — I21.4 NON-ST ELEVATION (NSTEMI) MYOCARDIAL INFARCTION: ICD-10-CM

## 2020-09-19 PROBLEM — M48.00 SPINAL STENOSIS, SITE UNSPECIFIED: Chronic | Status: ACTIVE | Noted: 2019-10-12

## 2020-09-19 LAB
A1C WITH ESTIMATED AVERAGE GLUCOSE RESULT: 6.2 % — HIGH (ref 4–5.6)
ANION GAP SERPL CALC-SCNC: 11 MMOL/L — SIGNIFICANT CHANGE UP (ref 5–17)
APTT BLD: 79.4 SEC — HIGH (ref 27.5–35.5)
BUN SERPL-MCNC: 14 MG/DL — SIGNIFICANT CHANGE UP (ref 7–23)
CALCIUM SERPL-MCNC: 9.2 MG/DL — SIGNIFICANT CHANGE UP (ref 8.4–10.5)
CHLORIDE SERPL-SCNC: 102 MMOL/L — SIGNIFICANT CHANGE UP (ref 96–108)
CHOLEST SERPL-MCNC: 138 MG/DL — SIGNIFICANT CHANGE UP (ref 10–199)
CK MB CFR SERPL CALC: 51.5 NG/ML — HIGH (ref 0–6.7)
CK MB CFR SERPL CALC: 52.8 NG/ML — HIGH (ref 0–6.7)
CK MB CFR SERPL CALC: 57 NG/ML — HIGH (ref 0–6.7)
CK SERPL-CCNC: 473 U/L — HIGH (ref 25–170)
CK SERPL-CCNC: 483 U/L — HIGH (ref 25–170)
CK SERPL-CCNC: 498 U/L — HIGH (ref 25–170)
CO2 SERPL-SCNC: 27 MMOL/L — SIGNIFICANT CHANGE UP (ref 22–31)
CREAT SERPL-MCNC: 0.47 MG/DL — LOW (ref 0.5–1.3)
ESTIMATED AVERAGE GLUCOSE: 131 MG/DL — HIGH (ref 68–114)
GLUCOSE BLDC GLUCOMTR-MCNC: 166 MG/DL — HIGH (ref 70–99)
GLUCOSE BLDC GLUCOMTR-MCNC: 166 MG/DL — HIGH (ref 70–99)
GLUCOSE BLDC GLUCOMTR-MCNC: 185 MG/DL — HIGH (ref 70–99)
GLUCOSE BLDC GLUCOMTR-MCNC: 234 MG/DL — HIGH (ref 70–99)
GLUCOSE SERPL-MCNC: 128 MG/DL — HIGH (ref 70–99)
HCT VFR BLD CALC: 35.4 % — SIGNIFICANT CHANGE UP (ref 34.5–45)
HDLC SERPL-MCNC: 76 MG/DL — SIGNIFICANT CHANGE UP
HGB BLD-MCNC: 11.4 G/DL — LOW (ref 11.5–15.5)
INR BLD: 1.11 — SIGNIFICANT CHANGE UP (ref 0.88–1.16)
LIPID PNL WITH DIRECT LDL SERPL: 53 MG/DL — SIGNIFICANT CHANGE UP
MCHC RBC-ENTMCNC: 30.6 PG — SIGNIFICANT CHANGE UP (ref 27–34)
MCHC RBC-ENTMCNC: 32.2 GM/DL — SIGNIFICANT CHANGE UP (ref 32–36)
MCV RBC AUTO: 95.2 FL — SIGNIFICANT CHANGE UP (ref 80–100)
NRBC # BLD: 0 /100 WBCS — SIGNIFICANT CHANGE UP (ref 0–0)
PLATELET # BLD AUTO: 129 K/UL — LOW (ref 150–400)
POTASSIUM SERPL-MCNC: 3.8 MMOL/L — SIGNIFICANT CHANGE UP (ref 3.5–5.3)
POTASSIUM SERPL-SCNC: 3.8 MMOL/L — SIGNIFICANT CHANGE UP (ref 3.5–5.3)
PROTHROM AB SERPL-ACNC: 13.3 SEC — SIGNIFICANT CHANGE UP (ref 10.6–13.6)
RBC # BLD: 3.72 M/UL — LOW (ref 3.8–5.2)
RBC # FLD: 12.4 % — SIGNIFICANT CHANGE UP (ref 10.3–14.5)
SARS-COV-2 RNA SPEC QL NAA+PROBE: SIGNIFICANT CHANGE UP
SODIUM SERPL-SCNC: 140 MMOL/L — SIGNIFICANT CHANGE UP (ref 135–145)
TOTAL CHOLESTEROL/HDL RATIO MEASUREMENT: 1.8 RATIO — LOW (ref 3.3–7.1)
TRIGL SERPL-MCNC: 47 MG/DL — SIGNIFICANT CHANGE UP (ref 10–149)
TROPONIN T SERPL-MCNC: 1.86 NG/ML — CRITICAL HIGH (ref 0–0.01)
TROPONIN T SERPL-MCNC: 3.05 NG/ML — CRITICAL HIGH (ref 0–0.01)
TROPONIN T SERPL-MCNC: 3.48 NG/ML — CRITICAL HIGH (ref 0–0.01)
WBC # BLD: 6.45 K/UL — SIGNIFICANT CHANGE UP (ref 3.8–10.5)
WBC # FLD AUTO: 6.45 K/UL — SIGNIFICANT CHANGE UP (ref 3.8–10.5)

## 2020-09-19 PROCEDURE — 93306 TTE W/DOPPLER COMPLETE: CPT | Mod: 26

## 2020-09-19 PROCEDURE — 93010 ELECTROCARDIOGRAM REPORT: CPT

## 2020-09-19 PROCEDURE — 99222 1ST HOSP IP/OBS MODERATE 55: CPT

## 2020-09-19 PROCEDURE — 92928 PRQ TCAT PLMT NTRAC ST 1 LES: CPT | Mod: LD

## 2020-09-19 PROCEDURE — 92978 ENDOLUMINL IVUS OCT C 1ST: CPT | Mod: 26,LD

## 2020-09-19 PROCEDURE — 93458 L HRT ARTERY/VENTRICLE ANGIO: CPT | Mod: 26,59

## 2020-09-19 RX ORDER — APIXABAN 2.5 MG/1
1 TABLET, FILM COATED ORAL
Qty: 60 | Refills: 2
Start: 2020-09-19 | End: 2020-12-17

## 2020-09-19 RX ORDER — HEPARIN SODIUM 5000 [USP'U]/ML
1200 INJECTION INTRAVENOUS; SUBCUTANEOUS
Qty: 25000 | Refills: 0 | Status: DISCONTINUED | OUTPATIENT
Start: 2020-09-19 | End: 2020-09-19

## 2020-09-19 RX ORDER — NITROGLYCERIN 6.5 MG
0.4 CAPSULE, EXTENDED RELEASE ORAL ONCE
Refills: 0 | Status: COMPLETED | OUTPATIENT
Start: 2020-09-19 | End: 2020-09-19

## 2020-09-19 RX ORDER — ASPIRIN/CALCIUM CARB/MAGNESIUM 324 MG
81 TABLET ORAL DAILY
Refills: 0 | Status: DISCONTINUED | OUTPATIENT
Start: 2020-09-19 | End: 2020-09-20

## 2020-09-19 RX ORDER — CLOPIDOGREL BISULFATE 75 MG/1
600 TABLET, FILM COATED ORAL ONCE
Refills: 0 | Status: COMPLETED | OUTPATIENT
Start: 2020-09-19 | End: 2020-09-19

## 2020-09-19 RX ORDER — CLOPIDOGREL BISULFATE 75 MG/1
75 TABLET, FILM COATED ORAL DAILY
Refills: 0 | Status: DISCONTINUED | OUTPATIENT
Start: 2020-09-20 | End: 2020-09-20

## 2020-09-19 RX ORDER — SODIUM CHLORIDE 9 MG/ML
500 INJECTION INTRAMUSCULAR; INTRAVENOUS; SUBCUTANEOUS
Refills: 0 | Status: DISCONTINUED | OUTPATIENT
Start: 2020-09-19 | End: 2020-09-19

## 2020-09-19 RX ORDER — ATORVASTATIN CALCIUM 80 MG/1
80 TABLET, FILM COATED ORAL AT BEDTIME
Refills: 0 | Status: DISCONTINUED | OUTPATIENT
Start: 2020-09-19 | End: 2020-09-19

## 2020-09-19 RX ORDER — POTASSIUM CHLORIDE 20 MEQ
20 PACKET (EA) ORAL ONCE
Refills: 0 | Status: COMPLETED | OUTPATIENT
Start: 2020-09-19 | End: 2020-09-19

## 2020-09-19 RX ORDER — APIXABAN 2.5 MG/1
5 TABLET, FILM COATED ORAL EVERY 12 HOURS
Refills: 0 | Status: DISCONTINUED | OUTPATIENT
Start: 2020-09-19 | End: 2020-09-20

## 2020-09-19 RX ORDER — ASPIRIN/CALCIUM CARB/MAGNESIUM 324 MG
243 TABLET ORAL ONCE
Refills: 0 | Status: COMPLETED | OUTPATIENT
Start: 2020-09-19 | End: 2020-09-19

## 2020-09-19 RX ORDER — HYDROCORTISONE 20 MG
200 TABLET ORAL ONCE
Refills: 0 | Status: COMPLETED | OUTPATIENT
Start: 2020-09-19 | End: 2020-09-19

## 2020-09-19 RX ORDER — ATORVASTATIN CALCIUM 80 MG/1
40 TABLET, FILM COATED ORAL AT BEDTIME
Refills: 0 | Status: DISCONTINUED | OUTPATIENT
Start: 2020-09-19 | End: 2020-09-20

## 2020-09-19 RX ORDER — DIPHENHYDRAMINE HCL 50 MG
50 CAPSULE ORAL ONCE
Refills: 0 | Status: DISCONTINUED | OUTPATIENT
Start: 2020-09-19 | End: 2020-09-20

## 2020-09-19 RX ADMIN — Medication 25 MILLIGRAM(S): at 04:32

## 2020-09-19 RX ADMIN — Medication 243 MILLIGRAM(S): at 12:27

## 2020-09-19 RX ADMIN — SODIUM CHLORIDE 75 MILLILITER(S): 9 INJECTION INTRAMUSCULAR; INTRAVENOUS; SUBCUTANEOUS at 15:34

## 2020-09-19 RX ADMIN — Medication 20 MILLIEQUIVALENT(S): at 23:10

## 2020-09-19 RX ADMIN — PANTOPRAZOLE SODIUM 40 MILLIGRAM(S): 20 TABLET, DELAYED RELEASE ORAL at 07:09

## 2020-09-19 RX ADMIN — Medication 81 MILLIGRAM(S): at 07:35

## 2020-09-19 RX ADMIN — Medication 1: at 12:22

## 2020-09-19 RX ADMIN — ATORVASTATIN CALCIUM 40 MILLIGRAM(S): 80 TABLET, FILM COATED ORAL at 22:21

## 2020-09-19 RX ADMIN — Medication 0.4 MILLIGRAM(S): at 07:09

## 2020-09-19 RX ADMIN — Medication 1: at 22:22

## 2020-09-19 RX ADMIN — Medication 20 MILLIEQUIVALENT(S): at 15:34

## 2020-09-19 RX ADMIN — HEPARIN SODIUM 1200 UNIT(S)/HR: 5000 INJECTION INTRAVENOUS; SUBCUTANEOUS at 07:08

## 2020-09-19 RX ADMIN — Medication 0.4 MILLIGRAM(S): at 03:03

## 2020-09-19 RX ADMIN — Medication 50 MILLIGRAM(S): at 07:40

## 2020-09-19 RX ADMIN — Medication 1: at 17:20

## 2020-09-19 RX ADMIN — Medication 2: at 07:09

## 2020-09-19 RX ADMIN — APIXABAN 5 MILLIGRAM(S): 2.5 TABLET, FILM COATED ORAL at 22:19

## 2020-09-19 RX ADMIN — Medication 180 MILLIGRAM(S): at 13:09

## 2020-09-19 RX ADMIN — CLOPIDOGREL BISULFATE 600 MILLIGRAM(S): 75 TABLET, FILM COATED ORAL at 07:35

## 2020-09-19 RX ADMIN — Medication 200 MILLIGRAM(S): at 12:58

## 2020-09-19 NOTE — CHART NOTE - NSCHARTNOTEFT_GEN_A_CORE
Received call from lab, second set of cardiac enzymes significantly elevated with Troponin T 3.05, CKMB 52.8, .        Re-evaluated patient at bedside resting comfortably, denies current acute complaints. Patient reports her chest pain has significant improved since admission. Patient denies current N/V, diaphoresis, SOB, palpitations or dizziness.         BP: 152/73, HR: 85, RR: 18, O2 sat: 97% RA.       GEN: comfortable in NAD   Neck: supple, no JVD   Pulm: CTA B/L   CV: irregularly irregular, S1S2  Abd: +BS, soft, NT/ND   Ext: warm well perfused, no pedal edema bilaterally   Neuro: no focal neurodeficits       Repeat EKG revealed Afib@76BPM with TWI V1-V2, PVC noted.           Patient R/I NSTEMI, current chest pain free on Heparin gtt, will initiate Statin. CCU fellow notified. Left message for patients HCP Dr. Rahman, awaiting call back. Will keep patient NPO for possible cardiac catheterization in AM. Received call from lab, second set of cardiac enzymes significantly elevated with Troponin T 3.05, CKMB 52.8, .        Re-evaluated patient at bedside resting comfortably, denies current acute complaints. Patient reports her chest pain has significant improved since admission. Patient denies current N/V, diaphoresis, SOB, palpitations or dizziness.         BP: 152/73, HR: 85, RR: 18, O2 sat: 97% RA.       GEN: comfortable in NAD   Neck: supple, no JVD   Pulm: CTA B/L   CV: irregularly irregular, S1S2  Abd: +BS, soft, NT/ND   Ext: warm well perfused, no pedal edema bilaterally   Neuro: no focal neurodeficits       Repeat EKG revealed Afib@76BPM with TWI V1-V2, PVC noted.           Patient R/I NSTEMI, current chest pain free on Heparin gtt, will initiate Statin. CCU fellow and patients HCP Dr. Rahman notified. Will keep patient NPO for possible cardiac catheterization in AM.

## 2020-09-19 NOTE — PROGRESS NOTE ADULT - PROBLEM SELECTOR PLAN 2
rate controlled with HR 80s, continue Diltiazem 180mg PO daily and Toprol XL 25mg PO daily.   -INR 1.14, subtherapeutic, will dose home Coumadin 3mg PO tonight and initiate Heparin gtt without bolus. rate controlled with HR 80s, continue Diltiazem 180mg PO daily and Toprol XL 25mg PO daily.   -patient on coumadin 3mg daily, INR 1.14 on admission, received coumadin 3mg PO (prior to finding of NSTEMI) and started heparin gtt   -transition to eliquis 5mg BID

## 2020-09-19 NOTE — PROGRESS NOTE ADULT - ATTENDING COMMENTS
Initial attending contact date 9/19/20. See attending addendum to HPI for initial contact documentation. See PA note written above, for details. I reviewed the PA documentation.  I have personally seen and examined this patient 9/19/20 on morning rounds. I reviewed vitals, labs, medications, cardiac studies and additional imaging.  I agree with the PA's findings and plans as written above with the following additions/amendments:  Patient with CP and positive troponin, r/i NSTEMI. Cath lab notified. Patient now s/p LHC with Dr. JOY Harris 9/19/20 with PCI to LAD.  Patient s/p allergy prep prior to LHC (anti histamines and steroids) given iodine allergy, anticipate reactive leukocytosis  Triple tx x 1 week, then drop ASA per interventional team instructions  Cont BB and statin  RYAN Almaguer.  Cardiology Attending.

## 2020-09-19 NOTE — PROVIDER CONTACT NOTE (CRITICAL VALUE NOTIFICATION) - BACKGROUND
Pt admitted for epigastric/chest pain, PMH of AFib rate controlled, HTN, GERD, DM, baseline chest pain rating 5/10 on unit admission

## 2020-09-19 NOTE — PROGRESS NOTE ADULT - PROBLEM SELECTOR PLAN 1
prior episode of SSCP resolved, currently c/o reproducible right upper chest pain. EKG Afib@75BPM with TWI V1-V2, AVL. CXR unremarkable. Labs notable for: Cardiac enzymes negative x 1 set, .  --will F/U cardiac enzymes@12AM and 5AM.  --obtain Echo in AM.    **Prior Echo 10/12/19 revealed LVH, normal LV size and wall motion, EF:60%. Normal RV size and function. Mild to moderate AR, mild MR, severe TR, PASP 38.3mmHg. -presented with SSCP, EKG Afib@75BPM with TWI V1-V2, AVL. CXR unremarkable  -initial trop negative, trop peaked at 3.48  -started on heparin gtt, loaded with asa/plavix. Received solu-cortef 200mg IV and benadryl 50mg IV for pre-medication prior to cath  -s/p cardiac cath w/JUAN x2 mLAD, normal EF, EDP 25, right radial access  -f/u echo report  -patient to continue triple therapy x1 week (aspirin, plavix, eliquis), then stop aspirin and continue plavix and eliquis for at least 1 year  -continue home metoprolol succinate 25mg daily  -start atorvastatin 40mg qhs    -ASA III, Mallampati II  -suitable candidate for moderate sedation  -cath consent obtained    Risks & benefits of procedure and alternative therapy have been explained to the patient including but not limited to: allergic reaction, bleeding w/possible need for blood transfusion, infection, renal and vascular compromise, limb damage, arrhythmia, stroke, vessel dissection/perforation, Myocardial infarction, emergent CABG.

## 2020-09-19 NOTE — PROGRESS NOTE ADULT - PROBLEM SELECTOR PLAN 3
continue PPI    VTE PPx: on Heparin gtt. -continue PPI    VTE PPx: on hold post-cath, start eliquis in AM

## 2020-09-19 NOTE — PATIENT PROFILE ADULT - LEGAL HELP
Last PHQ-9 score of 14 on 11/20/19.         Routing refill request to provider for review/approval because:  Blood pressure out of range      no

## 2020-09-19 NOTE — PROGRESS NOTE ADULT - ASSESSMENT
89 yr old F, right eye blindness, with IODINATED CONTRAST ALLERGY (rxn: angioedema/hives), PMH of HTN, diet controlled DM, GERD, Afib s/p [facility] [employer] PPM, severe TR presents to [facility] ED on 9/18/20 c/o an episode of acute onset substernal chest pressure/burning, R/I NSTEMI, with plan for cardiac catheterization today with Dr. Harris.        89 yr old F, right eye blindness, with IODINATED CONTRAST ALLERGY (rxn: angioedema/hives), PMH of HTN, diet controlled DM, GERD, Afib s/p [facility] [employer] PPM, severe TR, presents to [facility] ED on 9/18/20 c/o an episode of acute onset substernal chest pressure/burning, R/I NSTEMI, and underwent cardiac catheterization today with Dr. Harris s/p JUAN x2 mLAD.

## 2020-09-19 NOTE — PROGRESS NOTE ADULT - SUBJECTIVE AND OBJECTIVE BOX
INCOMPLETE    OVERNIGHT EVENTS: admitted to 5 uris    SUBJECTIVE / INTERVAL HPI: Patient seen and examined at bedside. Comfortable, denies CP, SOB, dizziness/lightheadedness, palpitations    VITAL SIGNS:  Vital Signs Last 24 Hrs  T(C): 35.9 (19 Sep 2020 09:35), Max: 36.8 (19 Sep 2020 02:05)  T(F): 96.7 (19 Sep 2020 09:35), Max: 98.3 (19 Sep 2020 02:05)  HR: 99 (19 Sep 2020 12:30) (84 - 108)  BP: 148/77 (19 Sep 2020 12:30) (133/80 - 152/73)  BP(mean): 99 (18 Sep 2020 21:28) (99 - 99)  RR: 18 (19 Sep 2020 12:30) (18 - 20)  SpO2: 95% (19 Sep 2020 12:30) (95% - 98%)    I&O's Summary    Height (cm): 175.3 (09-19 @ 02:02)  Weight (kg): 76.6 (09-19 @ 02:02)  BMI (kg/m2): 24.9 (09-19 @ 02:02)  BSA (m2): 1.92 (09-19 @ 02:02)    PHYSICAL EXAM:  General: sitting up in bed, NAD  HEENT: conjunctiva clear; MMM  Neck: +JVD  Cardiovascular: RRR, +SM  Respiratory: CTA B/L  Gastrointestinal: soft, NT/ND, +BS  Extremities: WWP, no edema or cyanosis  Vascular: carotid 2+ b/l, no bruits; radial 2+ b/l; femoral 2+ b/l no bruits; DP 1+ b/l, Right PT faint, Left PT 1+  Neurological: AAOx3, no focal deficits    MEDICATIONS:  MEDICATIONS  (STANDING):  aspirin enteric coated 81 milliGRAM(s) Oral daily  atorvastatin 80 milliGRAM(s) Oral at bedtime  dextrose 5%. 1000 milliLiter(s) (50 mL/Hr) IV Continuous <Continuous>  dextrose 50% Injectable 12.5 Gram(s) IV Push once  dextrose 50% Injectable 25 Gram(s) IV Push once  dextrose 50% Injectable 25 Gram(s) IV Push once  diltiazem    milliGRAM(s) Oral daily  diphenhydrAMINE   Injectable 50 milliGRAM(s) IV Push once  heparin  Infusion. 1200 Unit(s)/Hr (12 mL/Hr) IV Continuous <Continuous>  insulin lispro (HumaLOG) corrective regimen sliding scale   SubCutaneous Before meals and at bedtime  metoprolol succinate ER 25 milliGRAM(s) Oral daily  pantoprazole    Tablet 40 milliGRAM(s) Oral before breakfast  potassium chloride    Tablet ER 20 milliEquivalent(s) Oral once    MEDICATIONS  (PRN):  dextrose 40% Gel 15 Gram(s) Oral once PRN Blood Glucose LESS THAN 70 milliGRAM(s)/deciliter  glucagon  Injectable 1 milliGRAM(s) IntraMuscular once PRN Glucose LESS THAN 70 milligrams/deciliter  heparin   Injectable 5500 Unit(s) IV Push every 6 hours PRN For aPTT less than 40  heparin   Injectable 2500 Unit(s) IV Push every 6 hours PRN For aPTT between 40 - 57      LABS:                        11.4   6.45  )-----------( 129      ( 19 Sep 2020 05:03 )             35.4       09-19    140  |  102  |  14  ----------------------------<  128<H>  3.8   |  27  |  0.47<L>    Ca    9.2      19 Sep 2020 05:04    TPro  6.9  /  Alb  4.3  /  TBili  0.4  /  DBili  x   /  AST  18  /  ALT  12  /  AlkPhos  66  09-18      PT/INR - ( 19 Sep 2020 05:03 )   PT: 13.3 sec;   INR: 1.11          PTT - ( 19 Sep 2020 05:03 )  PTT:79.4 sec    CARDIAC MARKERS ( 19 Sep 2020 10:30 )  x     / 1.86 ng/mL / 473 U/L / x     / 51.5 ng/mL  CARDIAC MARKERS ( 19 Sep 2020 05:04 )  x     / 3.48 ng/mL / 498 U/L / x     / 57.0 ng/mL  CARDIAC MARKERS ( 19 Sep 2020 01:14 )  x     / 3.05 ng/mL / 483 U/L / x     / 52.8 ng/mL  CARDIAC MARKERS ( 18 Sep 2020 19:47 )  x     / <0.01 ng/mL / 54 U/L / x     / 2.3 ng/mL      RADIOLOGY & ADDITIONAL TESTS: Reviewed. OVERNIGHT EVENTS: admitted to 5 ur    SUBJECTIVE / INTERVAL HPI: Patient seen and examined at bedside. Comfortable, denies CP, SOB, dizziness/lightheadedness, palpitations    VITAL SIGNS:  Vital Signs Last 24 Hrs  T(C): 35.9 (19 Sep 2020 09:35), Max: 36.8 (19 Sep 2020 02:05)  T(F): 96.7 (19 Sep 2020 09:35), Max: 98.3 (19 Sep 2020 02:05)  HR: 99 (19 Sep 2020 12:30) (84 - 108)  BP: 148/77 (19 Sep 2020 12:30) (133/80 - 152/73)  BP(mean): 99 (18 Sep 2020 21:28) (99 - 99)  RR: 18 (19 Sep 2020 12:30) (18 - 20)  SpO2: 95% (19 Sep 2020 12:30) (95% - 98%)    I&O's Summary    Height (cm): 175.3 (09-19 @ 02:02)  Weight (kg): 76.6 (09-19 @ 02:02)  BMI (kg/m2): 24.9 (09-19 @ 02:02)  BSA (m2): 1.92 (09-19 @ 02:02)    PHYSICAL EXAM:  General: sitting up in bed, NAD  HEENT: conjunctiva clear; MMM  Neck: +JVD  Cardiovascular: RRR, +SM  Respiratory: CTA B/L  Gastrointestinal: soft, NT/ND, +BS  Extremities: WWP, no edema or cyanosis  Vascular: carotid 2+ b/l, no bruits; radial 2+ b/l; femoral 2+ b/l no bruits; DP 1+ b/l, Right PT faint, Left PT 1+  Neurological: AAOx3, no focal deficits    MEDICATIONS:  MEDICATIONS  (STANDING):  aspirin enteric coated 81 milliGRAM(s) Oral daily  atorvastatin 80 milliGRAM(s) Oral at bedtime  dextrose 5%. 1000 milliLiter(s) (50 mL/Hr) IV Continuous <Continuous>  dextrose 50% Injectable 12.5 Gram(s) IV Push once  dextrose 50% Injectable 25 Gram(s) IV Push once  dextrose 50% Injectable 25 Gram(s) IV Push once  diltiazem    milliGRAM(s) Oral daily  diphenhydrAMINE   Injectable 50 milliGRAM(s) IV Push once  heparin  Infusion. 1200 Unit(s)/Hr (12 mL/Hr) IV Continuous <Continuous>  insulin lispro (HumaLOG) corrective regimen sliding scale   SubCutaneous Before meals and at bedtime  metoprolol succinate ER 25 milliGRAM(s) Oral daily  pantoprazole    Tablet 40 milliGRAM(s) Oral before breakfast  potassium chloride    Tablet ER 20 milliEquivalent(s) Oral once    MEDICATIONS  (PRN):  dextrose 40% Gel 15 Gram(s) Oral once PRN Blood Glucose LESS THAN 70 milliGRAM(s)/deciliter  glucagon  Injectable 1 milliGRAM(s) IntraMuscular once PRN Glucose LESS THAN 70 milligrams/deciliter  heparin   Injectable 5500 Unit(s) IV Push every 6 hours PRN For aPTT less than 40  heparin   Injectable 2500 Unit(s) IV Push every 6 hours PRN For aPTT between 40 - 57      LABS:                        11.4   6.45  )-----------( 129      ( 19 Sep 2020 05:03 )             35.4       09-19    140  |  102  |  14  ----------------------------<  128<H>  3.8   |  27  |  0.47<L>    Ca    9.2      19 Sep 2020 05:04    TPro  6.9  /  Alb  4.3  /  TBili  0.4  /  DBili  x   /  AST  18  /  ALT  12  /  AlkPhos  66  09-18      PT/INR - ( 19 Sep 2020 05:03 )   PT: 13.3 sec;   INR: 1.11          PTT - ( 19 Sep 2020 05:03 )  PTT:79.4 sec    CARDIAC MARKERS ( 19 Sep 2020 10:30 )  x     / 1.86 ng/mL / 473 U/L / x     / 51.5 ng/mL  CARDIAC MARKERS ( 19 Sep 2020 05:04 )  x     / 3.48 ng/mL / 498 U/L / x     / 57.0 ng/mL  CARDIAC MARKERS ( 19 Sep 2020 01:14 )  x     / 3.05 ng/mL / 483 U/L / x     / 52.8 ng/mL  CARDIAC MARKERS ( 18 Sep 2020 19:47 )  x     / <0.01 ng/mL / 54 U/L / x     / 2.3 ng/mL      RADIOLOGY & ADDITIONAL TESTS: Reviewed.

## 2020-09-20 ENCOUNTER — TRANSCRIPTION ENCOUNTER (OUTPATIENT)
Age: 85
End: 2020-09-20

## 2020-09-20 VITALS — TEMPERATURE: 98 F

## 2020-09-20 LAB
ANION GAP SERPL CALC-SCNC: 10 MMOL/L — SIGNIFICANT CHANGE UP (ref 5–17)
BUN SERPL-MCNC: 19 MG/DL — SIGNIFICANT CHANGE UP (ref 7–23)
CALCIUM SERPL-MCNC: 9.2 MG/DL — SIGNIFICANT CHANGE UP (ref 8.4–10.5)
CHLORIDE SERPL-SCNC: 104 MMOL/L — SIGNIFICANT CHANGE UP (ref 96–108)
CO2 SERPL-SCNC: 26 MMOL/L — SIGNIFICANT CHANGE UP (ref 22–31)
CREAT SERPL-MCNC: 0.62 MG/DL — SIGNIFICANT CHANGE UP (ref 0.5–1.3)
GLUCOSE BLDC GLUCOMTR-MCNC: 135 MG/DL — HIGH (ref 70–99)
GLUCOSE BLDC GLUCOMTR-MCNC: 193 MG/DL — HIGH (ref 70–99)
GLUCOSE SERPL-MCNC: 140 MG/DL — HIGH (ref 70–99)
HCT VFR BLD CALC: 36 % — SIGNIFICANT CHANGE UP (ref 34.5–45)
HGB BLD-MCNC: 11.6 G/DL — SIGNIFICANT CHANGE UP (ref 11.5–15.5)
MAGNESIUM SERPL-MCNC: 2.2 MG/DL — SIGNIFICANT CHANGE UP (ref 1.6–2.6)
MCHC RBC-ENTMCNC: 30.8 PG — SIGNIFICANT CHANGE UP (ref 27–34)
MCHC RBC-ENTMCNC: 32.2 GM/DL — SIGNIFICANT CHANGE UP (ref 32–36)
MCV RBC AUTO: 95.5 FL — SIGNIFICANT CHANGE UP (ref 80–100)
NRBC # BLD: 0 /100 WBCS — SIGNIFICANT CHANGE UP (ref 0–0)
PLATELET # BLD AUTO: 121 K/UL — LOW (ref 150–400)
POTASSIUM SERPL-MCNC: 4.7 MMOL/L — SIGNIFICANT CHANGE UP (ref 3.5–5.3)
POTASSIUM SERPL-SCNC: 4.7 MMOL/L — SIGNIFICANT CHANGE UP (ref 3.5–5.3)
RBC # BLD: 3.77 M/UL — LOW (ref 3.8–5.2)
RBC # FLD: 12.7 % — SIGNIFICANT CHANGE UP (ref 10.3–14.5)
SODIUM SERPL-SCNC: 140 MMOL/L — SIGNIFICANT CHANGE UP (ref 135–145)
WBC # BLD: 8.6 K/UL — SIGNIFICANT CHANGE UP (ref 3.8–10.5)
WBC # FLD AUTO: 8.6 K/UL — SIGNIFICANT CHANGE UP (ref 3.8–10.5)

## 2020-09-20 PROCEDURE — 99239 HOSP IP/OBS DSCHRG MGMT >30: CPT

## 2020-09-20 RX ORDER — ATORVASTATIN CALCIUM 80 MG/1
1 TABLET, FILM COATED ORAL
Qty: 30 | Refills: 2
Start: 2020-09-20 | End: 2020-12-18

## 2020-09-20 RX ORDER — WARFARIN SODIUM 2.5 MG/1
1 TABLET ORAL
Qty: 0 | Refills: 0 | DISCHARGE

## 2020-09-20 RX ORDER — DILTIAZEM HCL 120 MG
1 CAPSULE, EXT RELEASE 24 HR ORAL
Qty: 30 | Refills: 2
Start: 2020-09-20 | End: 2020-12-18

## 2020-09-20 RX ORDER — METOPROLOL TARTRATE 50 MG
1 TABLET ORAL
Qty: 30 | Refills: 2
Start: 2020-09-20 | End: 2020-12-18

## 2020-09-20 RX ORDER — CLOPIDOGREL BISULFATE 75 MG/1
1 TABLET, FILM COATED ORAL
Qty: 30 | Refills: 5
Start: 2020-09-20 | End: 2021-03-18

## 2020-09-20 RX ORDER — ISOSORBIDE MONONITRATE 60 MG/1
30 TABLET, EXTENDED RELEASE ORAL DAILY
Refills: 0 | Status: DISCONTINUED | OUTPATIENT
Start: 2020-09-20 | End: 2020-09-20

## 2020-09-20 RX ORDER — ASPIRIN/CALCIUM CARB/MAGNESIUM 324 MG
1 TABLET ORAL
Qty: 6 | Refills: 1
Start: 2020-09-20 | End: 2020-10-01

## 2020-09-20 RX ORDER — ISOSORBIDE MONONITRATE 60 MG/1
1 TABLET, EXTENDED RELEASE ORAL
Qty: 30 | Refills: 2
Start: 2020-09-20 | End: 2020-12-18

## 2020-09-20 RX ORDER — APIXABAN 2.5 MG/1
1 TABLET, FILM COATED ORAL
Qty: 0 | Refills: 0 | DISCHARGE
Start: 2020-09-20

## 2020-09-20 RX ORDER — PANTOPRAZOLE SODIUM 20 MG/1
1 TABLET, DELAYED RELEASE ORAL
Qty: 30 | Refills: 2
Start: 2020-09-20 | End: 2020-12-18

## 2020-09-20 RX ADMIN — ISOSORBIDE MONONITRATE 30 MILLIGRAM(S): 60 TABLET, EXTENDED RELEASE ORAL at 11:55

## 2020-09-20 RX ADMIN — Medication 1: at 11:51

## 2020-09-20 RX ADMIN — Medication 25 MILLIGRAM(S): at 11:55

## 2020-09-20 RX ADMIN — Medication 180 MILLIGRAM(S): at 06:17

## 2020-09-20 RX ADMIN — CLOPIDOGREL BISULFATE 75 MILLIGRAM(S): 75 TABLET, FILM COATED ORAL at 06:17

## 2020-09-20 RX ADMIN — APIXABAN 5 MILLIGRAM(S): 2.5 TABLET, FILM COATED ORAL at 09:40

## 2020-09-20 RX ADMIN — Medication 81 MILLIGRAM(S): at 06:17

## 2020-09-20 NOTE — DISCHARGE NOTE PROVIDER - NSDCMRMEDTOKEN_GEN_ALL_CORE_FT
apixaban 5 mg oral tablet: 1 tab(s) orally 2 times a day   Coumadin 3 mg oral tablet: 1 tab(s) orally once a day  DilTIAZem Hydrochloride  mg/24 hours oral capsule, extended release: 1 cap(s) orally once a day  metoprolol succinate 25 mg oral tablet, extended release: 1 tab(s) orally once a day  pantoprazole 40 mg oral delayed release tablet: 1 tab(s) orally once a day    apixaban 5 mg oral tablet: 1 tab(s) orally every 12 hours  DilTIAZem Hydrochloride  mg/24 hours oral capsule, extended release: 1 cap(s) orally once a day  metoprolol succinate 25 mg oral tablet, extended release: 1 tab(s) orally once a day  pantoprazole 40 mg oral delayed release tablet: 1 tab(s) orally once a day    apixaban 5 mg oral tablet: 1 tab(s) orally every 12 hours  aspirin 81 mg oral delayed release tablet: 1 tab(s) orally once a day  atorvastatin 40 mg oral tablet: 1 tab(s) orally once a day (at bedtime)  clopidogrel 75 mg oral tablet: 1 tab(s) orally once a day  dilTIAZem 180 mg/24 hours oral tablet, extended release: 1 tab(s) orally once a day   isosorbide mononitrate 30 mg oral tablet, extended release: 1 tab(s) orally once a day  metoprolol succinate 25 mg oral tablet, extended release: 1 tab(s) orally once a day  pantoprazole 40 mg oral delayed release tablet: 1 tab(s) orally once a day

## 2020-09-20 NOTE — DISCHARGE NOTE PROVIDER - NSDCFUADDINST_GEN_ALL_CORE_FT
-Your procedure was done through your wrist -You do not need to keep this area covered and you may shower -Please avoid any heavy lifting  (no more than 3 to 5 lbs) or strenuous activity for five days -If you develop any swelling, bleeding, hardening of the skin (hematoma formation), acute pain, numbness/tingling  in your arm please contact your doctor immediately or call our 24/7 line: [phone]

## 2020-09-20 NOTE — DISCHARGE NOTE PROVIDER - PROVIDER TOKENS
PROVIDER:[TOKEN:[[id number]],FOLLOWUP:[2 weeks],ESTABLISHEDPATIENT:[T]],PROVIDER:[TOKEN:[[id number]],FOLLOWUP:[1 week]] PROVIDER:[TOKEN:[[id number]],FOLLOWUP:[1 week]],PROVIDER:[TOKEN:[[id number]],FOLLOWUP:[2 weeks]],PROVIDER:[TOKEN:[[id number]],FOLLOWUP:[2 weeks]],PROVIDER:[TOKEN:[[id number]],FOLLOWUP:[Routine]]

## 2020-09-20 NOTE — DISCHARGE NOTE PROVIDER - NSDCCPCAREPLAN_GEN_ALL_CORE_FT
PRINCIPAL DISCHARGE DIAGNOSIS  Diagnosis: NSTEMI (non-ST elevated myocardial infarction)  Assessment and Plan of Treatment:        PRINCIPAL DISCHARGE DIAGNOSIS  Diagnosis: NSTEMI (non-ST elevated myocardial infarction)  Assessment and Plan of Treatment: You were found to have a mild heart attack and subsequently underwent a cardiac catheterization, where the blockage/narrowing due to plaque build-up in your left anterior descending artery was opened using a stent. You must continue taking aspirin 81mg daily, Plavix 75 mg daily, and Eliquis 5mg twice daily for ONE WEEK, then stop aspirin and continue plavix/eliquis for at least 6 months. We have also added atorvastatin 40mg at bedtime.   PLEASE DO NOT MISS A DOSE OF ASPIRIN OR PLAVIX; IF YOU DO, YOU ARE AT RISK OF YOUR STENT CLOSING AND HAVING A HEART ATTACK.   Aspirin, Plavix, and Eliquis put you at increased risk of bleeding; please avoid NSAIDS (such as Motrin, Advil, Ibuprofen, Naproxen, or Aleve, as these medications may further your risk of bleeding.  Please follow up with  ____ in 1-2 weeks.         SECONDARY DISCHARGE DIAGNOSES  Diagnosis: Atrial fibrillation  Assessment and Plan of Treatment: You were previously on coumadin (blood thinner) in order to prevent strokes due to Afib. We have switched your blood thinner to Eliquis 5mg twice daily, as this medication is safer and more effective, especially since you will also be taking aspirin and plavix. Please continue metoprolol succinate 25mg daily and diltiazem 180mg daily to help control your heart rate.     PRINCIPAL DISCHARGE DIAGNOSIS  Diagnosis: NSTEMI (non-ST elevated myocardial infarction)  Assessment and Plan of Treatment: You were found to have a mild heart attack and subsequently underwent a cardiac catheterization, where the blockage/narrowing due to plaque build-up in your left anterior descending artery was opened using a stent. You must continue taking aspirin 81mg daily, Plavix 75 mg daily, and Eliquis 5mg twice daily for ONE WEEK, then stop aspirin and continue plavix/eliquis for at least 6 months. We have also added atorvastatin 40mg at bedtime (for cholesterol and to prevent the progression of plaque build-up) and Imdur 30mg daily (to help with anginal symptoms).  PLEASE DO NOT MISS A DOSE OF ASPIRIN OR PLAVIX; IF YOU DO, YOU ARE AT RISK OF YOUR STENT CLOSING AND HAVING A HEART ATTACK.   Aspirin, Plavix, and Eliquis put you at increased risk of bleeding; please avoid NSAIDS (such as Motrin, Advil, Ibuprofen, Naproxen, or Aleve, as these medications may further your risk of bleeding.  Please follow up with  ____ in 1-2 weeks.         SECONDARY DISCHARGE DIAGNOSES  Diagnosis: Atrial fibrillation  Assessment and Plan of Treatment: You were previously on coumadin (blood thinner) in order to prevent strokes due to Afib. We have switched your blood thinner to Eliquis 5mg twice daily, as this medication is safer and more effective, especially since you will also be taking aspirin and plavix. Please continue metoprolol succinate 25mg daily and diltiazem 180mg daily to help control your heart rate.     PRINCIPAL DISCHARGE DIAGNOSIS  Diagnosis: NSTEMI (non-ST elevated myocardial infarction)  Assessment and Plan of Treatment: You were found to have a mild heart attack and subsequently underwent a cardiac catheterization, where the blockage/narrowing due to plaque build-up in your left anterior descending artery was opened using a stent. You must continue taking aspirin 81mg daily, Plavix 75 mg daily, and Eliquis 5mg twice daily for ONE WEEK, then stop aspirin and continue plavix/eliquis for at least 6 months. We have also added atorvastatin 40mg at bedtime (for cholesterol and to prevent the progression of plaque build-up) and Imdur 30mg daily (to help with anginal symptoms).  PLEASE DO NOT MISS A DOSE OF ASPIRIN OR PLAVIX; IF YOU DO, YOU ARE AT RISK OF YOUR STENT CLOSING AND HAVING A HEART ATTACK.   Aspirin, Plavix, and Eliquis put you at increased risk of bleeding; please avoid NSAIDS (such as Motrin, Advil, Ibuprofen, Naproxen, or Aleve, as these medications may further your risk of bleeding.  Please follow up with Dr Santi Harris in 1-2 weeks and either Dr. Tavarez or Dr. James within 2 weeks.          SECONDARY DISCHARGE DIAGNOSES  Diagnosis: Atrial fibrillation  Assessment and Plan of Treatment: You were previously on coumadin (blood thinner) in order to prevent strokes due to Afib. We have switched your blood thinner to Eliquis 5mg twice daily, as this medication is safer and more effective, especially since you will also be taking aspirin and plavix. Please continue metoprolol succinate 25mg daily and diltiazem 180mg daily to help control your heart rate. Please make an appointment to follow up with Dr. Henderson for a pacemaker check.    Diagnosis: GERD (gastroesophageal reflux disease)  Assessment and Plan of Treatment: Continue pantoprazole.

## 2020-09-20 NOTE — DISCHARGE NOTE PROVIDER - HOSPITAL COURSE
89 yr old F, right eye blindness, with IODINATED CONTRAST ALLERGY (rxn: angioedema/hives), PMH of HTN, diet controlled DM, GERD, Afib s/p [facility] PPM, severe TR, presents to [facility] ED on 9/18/20 c/o an episode of acute onset substernal chest pressure/burning, trop elevated (peak 3.48), R/I NSTEMI, and underwent cardiac catheterization with Dr. Harris s/p JUAN x2 mLAD.    #NSTEMI   Pt presented with SSCP, EKG Afib@75BPM with TWI V1-V2, AVL. CXR unremarkable, initial trop negative, trop peaked at 3.48 and patient was initiating on heparin gtt w/subsequent trop downtrend to 1.86. She was loaded with aspirin and plavix, received prednisone 50mg PO x1 and solu-cortef 200mg IV for contrast allergy. She underwent cardiac cath 9/19 w/JUAN x2 mLAD, normal EF, EDP 10, right radial access. Other findings include: normal LM, mLAD 75% ruptured plaque (s/p JUAN x2), LCx co-dominant, <30% mid, RCA co-dominant, normal. TTE 9/19 revealed normal biventricular size/function, aortic sclerosis without significant stenosis, mild AR, severely calcified mitral valve, mild MR, mod-severe TR, pHTN (PASP 62mmHg).  11. Compared to the previous TTE performed on 10/12/2019, PASP is higher in this study. Patient will continue aspirin, plavix, and eliquis x1 week, then plavix and eliquis x6 months as per Interventional team instructions. She was started on atorvastatin 40mg QHS. She will continue metoprolol succinate 25mg daily.     #Atrial fibrillation  Rate controlled with HR 80s, continue Diltiazem 180mg PO daily and Toprol XL 25mg PO daily. Patient was on coumadin 3mg daily (had previously been recommended to change to eliquis but this was deferred due to cost), but will now plan to switch to eliquis 5mg BID. Patient/family willing to pay $347/month ($300 deductible, $47 copay) for Eliquis.     Patient will follow up with _____       89 yr old F, right eye blindness, with IODINATED CONTRAST ALLERGY (rxn: angioedema/hives), PMH of HTN, diet controlled DM, GERD, Afib s/p [facility] PPM, severe TR, presents to [facility] ED on 9/18/20 c/o an episode of acute onset substernal chest pressure/burning, trop elevated (peak 3.48), R/I NSTEMI, and underwent cardiac catheterization with Dr. Harris s/p JUAN x2 mLAD.    #NSTEMI   Pt presented with SSCP, EKG Afib@75BPM with TWI V1-V2, AVL. CXR unremarkable, initial trop negative, trop peaked at 3.48 and patient was initiating on heparin gtt w/subsequent trop downtrend to 1.86. She was loaded with aspirin and plavix, received prednisone 50mg PO x1 and solu-cortef 200mg IV for contrast allergy. She underwent cardiac cath 9/19 w/JUAN x2 mLAD, normal EF, EDP 10, right radial access. Other findings include: normal LM, mLAD 75% ruptured plaque (s/p JUAN x2), LCx co-dominant, <30% mid, RCA co-dominant, normal. TTE 9/19 revealed normal biventricular size/function, aortic sclerosis without significant stenosis, mild AR, severely calcified mitral valve, mild MR, mod-severe TR, pHTN (PASP 62mmHg). Compared to the previous TTE performed on 10/12/2019, PASP is higher in this study. Patient will continue aspirin, plavix, and eliquis x1 week, then plavix and eliquis x6 months as per Interventional team instructions. She was started on atorvastatin 40mg QHS and Imdur 30mg daily. She will continue home metoprolol succinate 25mg daily.     #Atrial fibrillation  Rate controlled with HR 80s, continued Diltiazem 180mg PO daily and Toprol XL 25mg PO daily. Patient was on coumadin 3mg daily (had previously been recommended to change to eliquis but this was deferred due to cost), but will now plan to switch to eliquis 5mg BID. Patient/family willing to pay $347/month ($300 deductible, $47 copay) for Eliquis.     Patient will follow up with _____       89 yr old F, right eye blindness, with IODINATED CONTRAST ALLERGY (rxn: angioedema/hives), PMH of HTN, diet controlled DM, GERD, Afib s/p [facility] PPM, severe TR, presents to [facility] ED on 9/18/20 c/o an episode of acute onset substernal chest pressure/burning, trop elevated (peak 3.48), R/I NSTEMI, and underwent cardiac catheterization with Dr. Harris s/p JUAN x2 mLAD.    #NSTEMI   Pt presented with SSCP, EKG Afib@75BPM with TWI V1-V2, AVL. CXR unremarkable, initial trop negative, trop peaked at 3.48 and patient was initiating on heparin gtt w/subsequent trop downtrend to 1.86. She was loaded with aspirin and plavix, received prednisone 50mg PO x1 and solu-cortef 200mg IV for contrast allergy. She underwent cardiac cath 9/19 w/JUAN x2 mLAD, normal EF, EDP 10, right radial access. Other findings include: normal LM, mLAD 75% ruptured plaque (s/p JUAN x2), LCx co-dominant, <30% mid, RCA co-dominant, normal. TTE 9/19 revealed normal biventricular size/function, aortic sclerosis without significant stenosis, mild AR, severely calcified mitral valve, mild MR, mod-severe TR, pHTN (PASP 62mmHg). Compared to the previous TTE performed on 10/12/2019, PASP is higher in this study. Patient will continue aspirin, plavix, and eliquis x1 week, then plavix and eliquis x6 months as per Interventional team instructions. She was started on atorvastatin 40mg QHS and Imdur 30mg daily. She will continue home metoprolol succinate 25mg daily.     #Atrial fibrillation  Rate controlled with HR 80s, continued Diltiazem 180mg PO daily and Toprol XL 25mg PO daily. Patient was on coumadin 3mg daily (had previously been recommended to change to eliquis but this was deferred due to cost), but will now plan to switch to eliquis 5mg BID. Patient/family willing to pay $347/month ($300 deductible, $47 copay) for Eliquis. Pt to follow up with Dr. Henderson.     Patient will follow up with Dr. Santi Harris in 1-2 weeks. Patient's family requesting Cardiology follow up in [place], so provided with information for Dr Tavarez and Dr. Jose James (per Dr. Harris's recommendation) and instructed to make an appointment within 2 weeks. PT evaluated and recommended home PT.

## 2020-09-20 NOTE — PHYSICAL THERAPY INITIAL EVALUATION ADULT - PERTINENT HX OF CURRENT PROBLEM, REHAB EVAL
89 yr old F, right eye blindness, with IODINATED CONTRAST ALLERGY (rxn: angioedema/hives), PMH of HTN, diet controlled DM, GERD, Afib s/p [facility] PPM, severe TR, presents to [facility] ED on 9/18/20 c/o an episode of acute onset substernal chest pressure/burning, R/I NSTEMI, and underwent cardiac catheterization today with Dr. Harris s/p JUAN x2 mLAD.

## 2020-09-20 NOTE — DISCHARGE NOTE PROVIDER - CARE PROVIDER_API CALL
Nadya Vazquez  CARDIOVASCULAR DISEASE  [address] [place] [address]  Phone: [phone]  Fax: [phone]  Established Patient  Follow Up Time: 2 weeks    Santi Harris)  Cardiovascular Disease; Interventional Cardiology  [address] [address]  Phone: [phone]  Fax: [phone]  Follow Up Time: 1 week   Santi Harris)  Cardiovascular Disease; Interventional Cardiology  [address], [address] [place] [address]  Phone: [phone]  Fax: [phone]  Follow Up Time: 1 week    Estela Tavarez)  Cardiovascular Disease; Internal Medicine  [address] [place] [address], [address] [place] [address]  Phone: [phone]  Fax: [phone]  Follow Up Time: 2 weeks    Jose James)  Cardiology  [address] [place] [address]  Phone: [phone]  Fax: [phone]  Follow Up Time: 2 weeks    Nishant Henderson)  Cardiac Electrophysiology  [address] [place] [address]  Phone: [phone]  Fax: [phone]  Follow Up Time: Routine

## 2020-09-20 NOTE — DISCHARGE NOTE NURSING/CASE MANAGEMENT/SOCIAL WORK - PATIENT PORTAL LINK FT
You can access the FollowMyHealth Patient Portal offered by [facility] by registering at the following website: [email or web address]. By joining [employer]’s FollowMyHealth portal, you will also be able to view your health information using other applications (apps) compatible with our system.

## 2020-09-20 NOTE — DISCHARGE NOTE PROVIDER - CARE PROVIDERS DIRECT ADDRESSES
,[email or web address]

## 2020-09-20 NOTE — PHYSICAL THERAPY INITIAL EVALUATION ADULT - GENERAL OBSERVATIONS, REHAB EVAL
Patient received seated at EOB (+) EKG (+) heplock left forearm, tolerating length of hallway and FOS CGA/Min A, reaching out for support, declining sc, Anticipate HPT to address barriers at home

## 2020-09-21 PROBLEM — K21.9 GASTRO-ESOPHAGEAL REFLUX DISEASE WITHOUT ESOPHAGITIS: Chronic | Status: ACTIVE | Noted: 2020-09-18

## 2020-09-21 PROBLEM — I48.91 UNSPECIFIED ATRIAL FIBRILLATION: Chronic | Status: ACTIVE | Noted: 2020-09-18

## 2020-09-21 LAB
SARS-COV-2 IGG SERPL IA-ACNC: 0.4 RATIO — SIGNIFICANT CHANGE UP
SARS-COV-2 IGG SERPL QL IA: NEGATIVE — SIGNIFICANT CHANGE UP
SARS-COV-2 IGG SERPL QL IA: NEGATIVE — SIGNIFICANT CHANGE UP
SARS-COV-2 IGM SERPL IA-ACNC: 0.23 RATIO — SIGNIFICANT CHANGE UP

## 2020-09-28 NOTE — PROGRESS NOTE ADULT - ATTENDING COMMENTS
Daughter Diamond calls to inform that they need help with care coordination. Reports that Toshia has been anxious lately. She lives alone by herself in a senior apartment.    Per protocol, advised clinic visit within 3 days. She states she has an appointment tomorrow at the [facility]. Care advice reviewed. Caller verbalizes understanding. Advised to call back with further questions/concerns.     Further details documented in HE chart.    [place] RN/[place] Nurse Advisor           Additional Information    Negative: Severe difficulty breathing (e.g., struggling for each breath, speaks in single words)    Negative: Bluish (or gray) lips or face    Negative: Difficult to awaken or acting confused  (e.g., disoriented, slurred speech)    Negative: Hysterical or combative behavior    Negative: Sounds like a life-threatening emergency to the triager    Negative: Difficulty breathing and persists > 10 minutes and not relieved by reassurance provided by triager    Negative: Lightheadedness or dizziness and persists > 10 minutes and not relieved by reassurance provided by triager    Negative: Alcohol or drug abuse, known or suspected, and feeling very shaky (i.e., visible tremors of hands)    Negative: Patient sounds very sick or weak to the triager    Negative: Patient sounds very upset or troubled to the triager    Negative: Symptoms interfere with work or school    Symptoms of anxiety or panic and has not been evaluated for this by physician    Protocols used: ANXIETY AND PANIC ATTACK-A-OH      
dizziness is related to BPPV and was seen by neurology and she improbed after that  Patient seen and examined with house-staff during bedside rounds.  Resident note read, including vitals, physical findings, laboratory data, and radiological reports.   Revisions included below.  Direct personal management at bed side and extensive interpretation of the data.  Plan was outlined and discussed in details with the housestaff.  Decision making of high complexity  Action taken for acute disease activity to reflect the level of care provided:  - medication reconciliation  - review laboratory data  discussed with cardoio and family several times
I have personally seen, examined, and participated in the care of this patient. I have reviewed all pertinent clinical information, including history, physical exam, plan and the PA's note and agree with the above.    -Patient states she feels little better, but still with lightheadedness.   -Appears dry on exam with mild orthostatic hypotension  -Start IVF  -With vague mild abdominal pain, but improved and still able eat meals  -Labs unremarkable. CT A/P - r/o abd pathology  -Doubt symptoms cardiac in etiology: EKG-rate controlled A fib, PPM interrogated without events, neg cpk/trop neg x 2, ECHO with normal EF, Mild MR, Mod AI, Severe TR but with normal PA pressures   -INR 3 today, cont to hold coumadin  -Will cont to monitor  -Will cont to monitor

## 2020-09-29 ENCOUNTER — APPOINTMENT (OUTPATIENT)
Dept: HEART AND VASCULAR | Facility: CLINIC | Age: 85
End: 2020-09-29
Payer: MEDICARE

## 2020-09-29 ENCOUNTER — APPOINTMENT (OUTPATIENT)
Dept: HEART AND VASCULAR | Facility: CLINIC | Age: 85
End: 2020-09-29

## 2020-09-29 DIAGNOSIS — I25.10 ATHEROSCLEROTIC HEART DISEASE OF NATIVE CORONARY ARTERY WITHOUT ANGINA PECTORIS: ICD-10-CM

## 2020-09-29 DIAGNOSIS — Z88.6 ALLERGY STATUS TO ANALGESIC AGENT: ICD-10-CM

## 2020-09-29 DIAGNOSIS — I10 ESSENTIAL (PRIMARY) HYPERTENSION: ICD-10-CM

## 2020-09-29 DIAGNOSIS — Z91.041 RADIOGRAPHIC DYE ALLERGY STATUS: ICD-10-CM

## 2020-09-29 DIAGNOSIS — K21.9 GASTRO-ESOPHAGEAL REFLUX DISEASE WITHOUT ESOPHAGITIS: ICD-10-CM

## 2020-09-29 DIAGNOSIS — Z95.0 PRESENCE OF CARDIAC PACEMAKER: ICD-10-CM

## 2020-09-29 DIAGNOSIS — Z88.5 ALLERGY STATUS TO NARCOTIC AGENT: ICD-10-CM

## 2020-09-29 DIAGNOSIS — R07.9 CHEST PAIN, UNSPECIFIED: ICD-10-CM

## 2020-09-29 DIAGNOSIS — I21.4 NON-ST ELEVATION (NSTEMI) MYOCARDIAL INFARCTION: ICD-10-CM

## 2020-09-29 DIAGNOSIS — I34.0 NONRHEUMATIC MITRAL (VALVE) INSUFFICIENCY: ICD-10-CM

## 2020-09-29 DIAGNOSIS — Z91.040 LATEX ALLERGY STATUS: ICD-10-CM

## 2020-09-29 DIAGNOSIS — M48.00 SPINAL STENOSIS, SITE UNSPECIFIED: ICD-10-CM

## 2020-09-29 DIAGNOSIS — I07.1 RHEUMATIC TRICUSPID INSUFFICIENCY: ICD-10-CM

## 2020-09-29 DIAGNOSIS — I48.91 UNSPECIFIED ATRIAL FIBRILLATION: ICD-10-CM

## 2020-09-29 DIAGNOSIS — Z79.01 LONG TERM (CURRENT) USE OF ANTICOAGULANTS: ICD-10-CM

## 2020-09-29 PROCEDURE — 99442: CPT

## 2020-10-05 ENCOUNTER — APPOINTMENT (OUTPATIENT)
Dept: HEART AND VASCULAR | Facility: CLINIC | Age: 85
End: 2020-10-05
Payer: MEDICARE

## 2020-10-05 ENCOUNTER — NON-APPOINTMENT (OUTPATIENT)
Age: 85
End: 2020-10-05

## 2020-10-05 VITALS
WEIGHT: 165 LBS | BODY MASS INDEX: 26.52 KG/M2 | SYSTOLIC BLOOD PRESSURE: 124 MMHG | DIASTOLIC BLOOD PRESSURE: 78 MMHG | TEMPERATURE: 95.3 F | HEIGHT: 66 IN | HEART RATE: 65 BPM

## 2020-10-05 PROCEDURE — 93000 ELECTROCARDIOGRAM COMPLETE: CPT

## 2020-10-05 PROCEDURE — 99215 OFFICE O/P EST HI 40 MIN: CPT

## 2020-10-07 PROCEDURE — 86769 SARS-COV-2 COVID-19 ANTIBODY: CPT

## 2020-10-07 PROCEDURE — 85730 THROMBOPLASTIN TIME PARTIAL: CPT

## 2020-10-07 PROCEDURE — 85025 COMPLETE CBC W/AUTO DIFF WBC: CPT

## 2020-10-07 PROCEDURE — 83880 ASSAY OF NATRIURETIC PEPTIDE: CPT

## 2020-10-07 PROCEDURE — 80053 COMPREHEN METABOLIC PANEL: CPT

## 2020-10-07 PROCEDURE — 85027 COMPLETE CBC AUTOMATED: CPT

## 2020-10-07 PROCEDURE — 84484 ASSAY OF TROPONIN QUANT: CPT

## 2020-10-07 PROCEDURE — 80061 LIPID PANEL: CPT

## 2020-10-07 PROCEDURE — 85610 PROTHROMBIN TIME: CPT

## 2020-10-07 PROCEDURE — C1769: CPT

## 2020-10-07 PROCEDURE — 82553 CREATINE MB FRACTION: CPT

## 2020-10-07 PROCEDURE — 82550 ASSAY OF CK (CPK): CPT

## 2020-10-07 PROCEDURE — 82962 GLUCOSE BLOOD TEST: CPT

## 2020-10-07 PROCEDURE — C1874: CPT

## 2020-10-07 PROCEDURE — C1725: CPT

## 2020-10-07 PROCEDURE — 83735 ASSAY OF MAGNESIUM: CPT

## 2020-10-07 PROCEDURE — 36415 COLL VENOUS BLD VENIPUNCTURE: CPT

## 2020-10-07 PROCEDURE — C1894: CPT

## 2020-10-07 PROCEDURE — U0003: CPT

## 2020-10-07 PROCEDURE — C1753: CPT

## 2020-10-07 PROCEDURE — 97161 PT EVAL LOW COMPLEX 20 MIN: CPT

## 2020-10-07 PROCEDURE — 83036 HEMOGLOBIN GLYCOSYLATED A1C: CPT

## 2020-10-07 PROCEDURE — 83690 ASSAY OF LIPASE: CPT

## 2020-10-07 PROCEDURE — C1887: CPT

## 2020-10-07 PROCEDURE — 93005 ELECTROCARDIOGRAM TRACING: CPT

## 2020-10-07 PROCEDURE — 71045 X-RAY EXAM CHEST 1 VIEW: CPT

## 2020-10-07 PROCEDURE — 96375 TX/PRO/DX INJ NEW DRUG ADDON: CPT

## 2020-10-07 PROCEDURE — 93306 TTE W/DOPPLER COMPLETE: CPT

## 2020-10-07 PROCEDURE — 99285 EMERGENCY DEPT VISIT HI MDM: CPT | Mod: 25

## 2020-10-07 PROCEDURE — 96374 THER/PROPH/DIAG INJ IV PUSH: CPT

## 2020-10-07 PROCEDURE — 80048 BASIC METABOLIC PNL TOTAL CA: CPT

## 2020-10-08 NOTE — DISCUSSION/SUMMARY
[FreeTextEntry1] : Patient is a 89 year old female with a past medical history of chronic afib s/p pacemaker (10 years ago), diet controlled DM, and HTN, who presents for follow-up post chest pain and LAD stent placement on 9/19. \par \par CAD without significant residual disease on optimal medical therapy.\par Preserved EF and without symptoms of HF. \par Severe pulmonary HTN in the setting of mod-severe TR and pacing wire. \par Follow up in 3 months. Maintain current medication regimen. \par Encouraged continue healthy lifestyle habits.

## 2020-10-08 NOTE — ASSESSMENT
[FreeTextEntry1] : Patient is a 89 year old female with a past medical history of chronic afib s/p pacemaker (10 years ago), diet controlled DM, and HTN, who presents for follow-up s/p chest pain and LAD stent placement on 9/19. \par \par Patient is asymptomatic from a cardiac standpoint. Maintain patient on current medication regimen and follow up in 3 months. Patient was advised to continue with healthy dietary choices, adequately hydrate to prevent dizziness on standing, and increase light intensity exercise frequency. Patient was also counselled about pacemaker battery replacement and how blood thinners can increase bruising frequency.

## 2020-10-08 NOTE — REASON FOR VISIT
[FreeTextEntry1] : Patient is a 89 year old female with a past medical history of chronic afib s/p pacemaker (10 years ago), diet controlled DM, and HTN, who presents for follow-up post chest pain and LAD stent placement on 9/19. \par \par

## 2020-10-08 NOTE — HISTORY OF PRESENT ILLNESS
[FreeTextEntry1] : Patient is a 89 year old female with a past medical history of chronic afib s/p pacemaker (10 years ago), diet controlled DM, and HTN, who presents for follow-up post chest pain and LAD stent placement on 9/19. \par \par Currently, patient is in good spirits but complains of minor dizziness upon sitting to standing, and small bruise on upper left thigh. She states that her heart burn (originally diagnosed as GERD) has disappeared since the stent procedure. She eats a healthy Mediterranean diet (score 7) and denies the use of cigarettes and alcohol. She becomes short of breath climbing one flight of stairs and gets minimal exercise due to age. Patient sleeps with one pillow and denies snoring. She has sinus problems that sometimes impact her sleep. Family plans to hire home physical therapy for cardiac rehabilitation.\par \par LDL- 58, non-LDL 76, HDL 82, total 158, trig 106\par \par 10/13/19 carotid scan \par -plaque bilaterally without stenosis \par \par 10/13/19 abdomen CT scan \par -dilated IVC + hepatic vein without dilated RA\par -calcified aortic plaque \par -left renal artery calcification with stenosis\par \par 9/19/20 Cath\par -normal LV \par -75% mid LAD, 30% mid LCX, 0% RCA, 60% EF\par \par 9/19/20 Echo\par -PA pressure 62 mmHg\par -moderate to severe TR\par -mild MR with device lead \par -aortic sclerosis/ mild AI\par -normal LV and RV size and function \par

## 2020-10-08 NOTE — PHYSICAL EXAM
[General Appearance - Well Developed] : well developed [Normal Appearance] : normal appearance [Well Groomed] : well groomed [General Appearance - Well Nourished] : well nourished [No Deformities] : no deformities [General Appearance - In No Acute Distress] : no acute distress [Normal Oral Mucosa] : normal oral mucosa [No Oral Pallor] : no oral pallor [No Oral Cyanosis] : no oral cyanosis [Normal Jugular Venous A Waves Present] : normal jugular venous A waves present [Normal Jugular Venous V Waves Present] : normal jugular venous V waves present [No Jugular Venous Hagan A Waves] : no jugular venous hagan A waves [Heart Rate And Rhythm] : heart rate and rhythm were normal [Heart Sounds] : normal S1 and S2 [Abdomen Soft] : soft [Abdomen Tenderness] : non-tender [Abdomen Mass (___ Cm)] : no abdominal mass palpated [Abnormal Walk] : normal gait [Gait - Sufficient For Exercise Testing] : the gait was sufficient for exercise testing [Nail Clubbing] : no clubbing of the fingernails [Cyanosis, Localized] : no localized cyanosis [Petechial Hemorrhages (___cm)] : no petechial hemorrhages [Skin Color & Pigmentation] : normal skin color and pigmentation [] : no rash [No Venous Stasis] : no venous stasis [Skin Lesions] : no skin lesions [No Skin Ulcers] : no skin ulcer [No Xanthoma] : no  xanthoma was observed [Oriented To Time, Place, And Person] : oriented to person, place, and time [Affect] : the affect was normal [Mood] : the mood was normal [No Anxiety] : not feeling anxious [FreeTextEntry1] : II/VIS ANNETTE RUSB

## 2020-10-27 ENCOUNTER — APPOINTMENT (OUTPATIENT)
Dept: HEART AND VASCULAR | Facility: CLINIC | Age: 85
End: 2020-10-27

## 2020-11-17 ENCOUNTER — APPOINTMENT (OUTPATIENT)
Dept: HEART AND VASCULAR | Facility: CLINIC | Age: 85
End: 2020-11-17
Payer: MEDICARE

## 2020-11-17 VITALS
WEIGHT: 165 LBS | DIASTOLIC BLOOD PRESSURE: 82 MMHG | HEART RATE: 94 BPM | HEIGHT: 66 IN | BODY MASS INDEX: 26.52 KG/M2 | SYSTOLIC BLOOD PRESSURE: 138 MMHG

## 2020-11-17 PROCEDURE — 93279 PRGRMG DEV EVAL PM/LDLS PM: CPT

## 2020-11-17 NOTE — PROCEDURE
[No] : not [Pacemaker] : pacemaker [VVIR] : VVIR [Lead Imp:  ___ohms] : lead impedance was [unfilled] ohms [Sensing Amplitude ___mv] : sensing amplitude was [unfilled] mv [___V @] : [unfilled] V [___ ms] : [unfilled] ms [de-identified] : CURT [de-identified] : 60 [de-identified] :  42%\par HR histograms show predominately 60-70 bpm\par \par LRL increased from 60 to 70 bpm

## 2020-11-17 NOTE — HISTORY OF PRESENT ILLNESS
[de-identified] : 89 y/o F with h/o DM II, HTN, CAD s/p stent 9/2020, permanent atrial fibrillation, moderate to severe TR, severe pHTN, PPM placement.  Presents for device check.  Exertional tolerance limited to one block.  Tolerating Eliquis without bleeding issues.

## 2020-11-25 NOTE — ED PROVIDER NOTE - ACUTE OR EVOLVING MI?
Assessment/Plan:  Gus Sabillon is a 79 y.o. male with PAD s/p BLE PTA/stent placement, HTN, HLD, CKD, GERD, who presents for an initial appointment.    1. Dizziness- Etiology unclear.  Check 30 day event monitor and echo to evaluate further.       2. Carotid Artery Disease- Mr. Sabillon has no amourosis fugax or TIA symptoms.  Dizziness is not typically related to symptomatic carotid disease.  Carotid Ultrasound on 11/20/2020 revealed 70-79% right Internal Carotid Stenosis and 70-79% left Internal Carotid Stenosis.  There is 50% bilateral ECA stenosis.  Ideally would check CTA neck to evaluate carotid disease further, but we are limited by pt's CKD with serum creatinine of 1.6 on 2/10/2020.  Continue ASA.  Increase rosuvastatin to 20 mg daily.      3. PAD- Stable.  Continue ASA.  Increase rosuvastatin to 20 mg daily.      4. HTN- Continue current medications.      5. HLD- LDL not at goal of less than 70.  Check cmp today.  ncrease rosuvastatin to 20 mg daily.      Follow up in 6 weeks   no

## 2021-02-23 ENCOUNTER — APPOINTMENT (OUTPATIENT)
Dept: HEART AND VASCULAR | Facility: CLINIC | Age: 86
End: 2021-02-23

## 2021-03-02 ENCOUNTER — APPOINTMENT (OUTPATIENT)
Dept: HEART AND VASCULAR | Facility: CLINIC | Age: 86
End: 2021-03-02

## 2021-03-23 ENCOUNTER — APPOINTMENT (OUTPATIENT)
Dept: HEART AND VASCULAR | Facility: CLINIC | Age: 86
End: 2021-03-23

## 2021-06-07 ENCOUNTER — NON-APPOINTMENT (OUTPATIENT)
Age: 86
End: 2021-06-07

## 2021-06-08 ENCOUNTER — APPOINTMENT (OUTPATIENT)
Dept: HOME HEALTH SERVICES | Facility: HOME HEALTH | Age: 86
End: 2021-06-08
Payer: MEDICARE

## 2021-06-08 VITALS
RESPIRATION RATE: 16 BRPM | SYSTOLIC BLOOD PRESSURE: 121 MMHG | OXYGEN SATURATION: 93 % | HEART RATE: 93 BPM | DIASTOLIC BLOOD PRESSURE: 76 MMHG | TEMPERATURE: 98.7 F

## 2021-06-08 DIAGNOSIS — M17.10 UNILATERAL PRIMARY OSTEOARTHRITIS, UNSPECIFIED KNEE: ICD-10-CM

## 2021-06-08 DIAGNOSIS — M25.512 PAIN IN LEFT SHOULDER: ICD-10-CM

## 2021-06-08 DIAGNOSIS — Z87.81 PERSONAL HISTORY OF (HEALED) TRAUMATIC FRACTURE: ICD-10-CM

## 2021-06-08 DIAGNOSIS — T78.40XA ALLERGY, UNSPECIFIED, INITIAL ENCOUNTER: ICD-10-CM

## 2021-06-08 DIAGNOSIS — R06.00 DYSPNEA, UNSPECIFIED: ICD-10-CM

## 2021-06-08 DIAGNOSIS — Z80.6 FAMILY HISTORY OF LEUKEMIA: ICD-10-CM

## 2021-06-08 DIAGNOSIS — H54.40 BLINDNESS, ONE EYE, UNSPECIFIED EYE: ICD-10-CM

## 2021-06-08 DIAGNOSIS — Z00.00 ENCOUNTER FOR GENERAL ADULT MEDICAL EXAMINATION W/OUT ABNORMAL FINDINGS: ICD-10-CM

## 2021-06-08 DIAGNOSIS — M17.0 BILATERAL PRIMARY OSTEOARTHRITIS OF KNEE: ICD-10-CM

## 2021-06-08 DIAGNOSIS — R07.89 OTHER CHEST PAIN: ICD-10-CM

## 2021-06-08 DIAGNOSIS — Z87.898 PERSONAL HISTORY OF OTHER SPECIFIED CONDITIONS: ICD-10-CM

## 2021-06-08 PROCEDURE — G0506: CPT

## 2021-06-08 PROCEDURE — 99345 HOME/RES VST NEW HIGH MDM 75: CPT | Mod: 25

## 2021-06-12 ENCOUNTER — TRANSCRIPTION ENCOUNTER (OUTPATIENT)
Age: 86
End: 2021-06-12

## 2021-06-12 ENCOUNTER — APPOINTMENT (OUTPATIENT)
Dept: HOME HEALTH SERVICES | Facility: HOME HEALTH | Age: 86
End: 2021-06-12
Payer: MEDICARE

## 2021-06-12 ENCOUNTER — NON-APPOINTMENT (OUTPATIENT)
Age: 86
End: 2021-06-12

## 2021-06-12 PROCEDURE — 99348 HOME/RES VST EST LOW MDM 30: CPT | Mod: 95

## 2021-06-13 PROBLEM — R07.89 CHEST TIGHTNESS: Status: RESOLVED | Noted: 2021-06-12 | Resolved: 2021-06-13

## 2021-06-13 PROBLEM — H54.40 BLINDNESS OF RIGHT EYE: Status: RESOLVED | Noted: 2021-06-13 | Resolved: 2021-06-13

## 2021-06-13 PROBLEM — Z87.81 HISTORY OF COMPRESSION FRACTURE OF SPINE: Status: RESOLVED | Noted: 2021-06-13 | Resolved: 2021-06-13

## 2021-06-13 RX ORDER — TRAMADOL HYDROCHLORIDE AND ACETAMINOPHEN 37.5; 325 MG/1; MG/1
37.5-325 TABLET, FILM COATED ORAL 3 TIMES DAILY
Qty: 40 | Refills: 0 | Status: DISCONTINUED | COMMUNITY
Start: 2021-02-18 | End: 2021-06-13

## 2021-06-13 RX ORDER — DILTIAZEM HYDROCHLORIDE 180 MG/1
180 CAPSULE, EXTENDED RELEASE ORAL
Qty: 90 | Refills: 2 | Status: DISCONTINUED | COMMUNITY
Start: 2018-04-04 | End: 2021-06-13

## 2021-06-14 ENCOUNTER — NON-APPOINTMENT (OUTPATIENT)
Age: 86
End: 2021-06-14

## 2021-06-14 PROBLEM — Z80.6 FAMILY HISTORY OF LEUKEMIA: Status: ACTIVE | Noted: 2021-06-14

## 2021-06-14 PROBLEM — M25.512 LEFT SHOULDER PAIN: Status: RESOLVED | Noted: 2019-05-30 | Resolved: 2021-06-14

## 2021-06-14 PROBLEM — M17.10 ARTHRITIS OF KNEE: Status: RESOLVED | Noted: 2021-06-13 | Resolved: 2021-06-14

## 2021-06-14 PROBLEM — M17.0 ARTHRITIS OF BOTH KNEES: Status: ACTIVE | Noted: 2019-05-30

## 2021-06-14 PROBLEM — T78.40XA ALLERGY: Status: ACTIVE | Noted: 2021-06-13

## 2021-06-14 NOTE — REASON FOR VISIT
[Initial Evaluation] : an initial evaluation [Family Member] : family member [Pre-Visit Preparation] : pre-visit preparation was done [Intercurrent Specialty/Sub-specialty Visits] : the patient has intercurrent specialty/sub-specialty visits [FreeTextEntry1] : CAD, afib

## 2021-06-14 NOTE — HISTORY OF PRESENT ILLNESS
[Patient] : patient [Family Member] : family member [FreeTextEntry1] : CAD, debiilty [FreeTextEntry2] : Patient denies fever, cough, trouble breathing, rash and vomiting. Patient has not been in close contact with someone who is COVID positive. N95 mask, gloves and eye wear worn during visit: Y \par Total face to face time with patient: 120 minutes. \par \par 89 yo F w/ PMHx right eye blindness (s/p cataract surgery, cornea transplant didn’t work), afib s/p [facility] PPM (10 years ago), CAD s/p LAD stent (2019, 2020), moderate to severe TR, very calcified mitral valve, severe pulmonary hypertension (TTE, 9/2020), PVD, DM2 (on metformin), HTN, dementia, knee arthritis (son gives cortisone shots about twice annually), anxiety, compression fracture s/p fall from bed, \par \par Will order lab work next week \par \par MOLST full code, yes to all \par Hospitalization: \par Earlier this spring, admitted to [facility] for bronchitis and found to have COVID+ (s/p antibodies and steroids) \par 9/18-9/2020: a/w chest pain and s/p JUAN x2 to mLAD. \par \par Very sensitive to pain medications (i.e., went to ED due to BP drop when taking codeine) \par \par DM2: on metformin, Checks daily BG. BG range 151-264\par Pain due to compression fracture much improved: only taking Tylenol PRN \par Chronic JACOME: stable \par \par Appetite/dysphagia/weight: Intact. no dysphagia. \par Regular BMs. \par Urine: Sometimes urinates night, wears diapers at night in case of incontinence \par Sleep: Not great. Lifelong issue. \par Mood: Denies depression/anxiety. \par Memory: Mild forgetfulness \par Skin: No issues  \par DME: Has walker/cane, but doesn't need to use them; Will get chairlift \par Receiving home PT \par \par Received [employer]: Late February, Late March \par Has not taken PNA shot. \par \par EP physician: Leesa \par Tram Conner ([facility]): pulm\par \par Son is an orthopedic surgeon \par Other son tis Anesthesiologist. \par Private help 24/7\par Has HCP: Anuel (son) \par \par Has two sons and 4 grandchildren. \par Worked in 's office. \par Nepalese Rastafarian Taoist is important to patient.

## 2021-06-14 NOTE — CHRONIC CARE ASSESSMENT
[PPS Score: ____] : Palliative Performance Scale (PPS) Score: [unfilled] [de-identified] : n/a [de-identified] : adequate

## 2021-06-14 NOTE — COUNSELING
[Normal Weight - ( BMI  <25 )] : normal weight - ( BMI  <25 ) [Continue diet as tolerated] : continue diet as tolerated based on goals of care [Non - Smoker] : non-smoker [Smoke/CO Detectors] : smoke/CO detectors [Use assistive device to avoid falls] : use assistive device to avoid falls [] : foot exam [Completed] : Aspirin use discussion completed [Decrease stress] : decrease stress [Decrease hospital use] : decrease hospital use [Minimize unnecessary interventions] : minimize unnecessary interventions [Maintain functional ability] : maintain functional ability [Likely to achieve goals/desired outcomes] : likely to achieve goals/desired outcomes [Advanced Directives discussed: ____] : Advanced directives discussed: [unfilled] [Completed Medical Orders for Life-Sustaining Treatment] : completed medical orders for life-sustaining treatment [Full Code] : Code Status: Full Code [Trial of Intubation] : Intubation: Trial of Intubation [No Limitations] : Treatment Guidelines: No limitations [[facility] Date: _____] [facility]/last verification date: [unfilled] [ - New patient with 2 or more chronic conditions; CCM discussed and patient-centered care plan established] : New patient with 2 or more chronic conditions; CCM discussed and patient-centered care plan established

## 2021-06-14 NOTE — PHYSICAL EXAM
[No Acute Distress] : no acute distress [Well Nourished] : well nourished [Well Developed] : well developed [Normal Sclera/Conjunctiva] : normal sclera/conjunctiva [EOMI] : extra ocular movement intact [Normal Oropharynx] : the oropharynx was normal [Supple] : the neck was supple [No Respiratory Distress] : no respiratory distress [Clear to Auscultation] : lungs were clear to auscultation bilaterally [No Accessory Muscle Use] : no accessory muscle use [Normal Rate] : heart rate was normal  [Normal S1, S2] : normal S1 and S2 [Regular Rhythm] : with a regular rhythm [No Edema] : there was no peripheral edema [Non Tender] : non-tender [Soft] : abdomen soft [Not Distended] : not distended [No Joint Swelling] : no joint swelling seen [No Rash] : no rash [No Skin Lesions] : no skin lesions [Cranial Nerves Intact] : cranial nerves 2-12 were intact [No Motor Deficits] : the motor exam was normal [No Gross Sensory Deficits] : no gross sensory deficits [Normal Affect] : the affect was normal [Normal Mood] : the mood was normal [de-identified] : Right eye blindness

## 2021-06-14 NOTE — HEALTH RISK ASSESSMENT
[HRA Reviewed] : Health risk assessment reviewed [Independent] : managing medications [Some assistance needed] : managing finances [Full assistance needed] : using transportation [Any fall with injury in past year] : Patient reported fall with injury in the past year [Yes] : The patient does have visual impairment [TimeGetUpGo] : 0

## 2021-06-17 ENCOUNTER — NON-APPOINTMENT (OUTPATIENT)
Age: 86
End: 2021-06-17

## 2021-06-24 ENCOUNTER — APPOINTMENT (OUTPATIENT)
Dept: HOME HEALTH SERVICES | Facility: HOME HEALTH | Age: 86
End: 2021-06-24
Payer: MEDICARE

## 2021-06-24 VITALS
SYSTOLIC BLOOD PRESSURE: 101 MMHG | HEART RATE: 79 BPM | RESPIRATION RATE: 16 BRPM | DIASTOLIC BLOOD PRESSURE: 81 MMHG | OXYGEN SATURATION: 99 % | TEMPERATURE: 98.2 F

## 2021-06-24 DIAGNOSIS — K21.9 GASTRO-ESOPHAGEAL REFLUX DISEASE W/OUT ESOPHAGITIS: ICD-10-CM

## 2021-06-24 PROCEDURE — G0439: CPT

## 2021-06-24 PROCEDURE — 99349 HOME/RES VST EST MOD MDM 40: CPT | Mod: 25

## 2021-07-15 ENCOUNTER — NON-APPOINTMENT (OUTPATIENT)
Age: 86
End: 2021-07-15

## 2021-07-19 ENCOUNTER — APPOINTMENT (OUTPATIENT)
Dept: HOME HEALTH SERVICES | Facility: HOME HEALTH | Age: 86
End: 2021-07-19

## 2021-07-19 VITALS
DIASTOLIC BLOOD PRESSURE: 78 MMHG | HEART RATE: 90 BPM | OXYGEN SATURATION: 99 % | RESPIRATION RATE: 16 BRPM | SYSTOLIC BLOOD PRESSURE: 122 MMHG | TEMPERATURE: 98.8 F

## 2021-07-21 PROBLEM — K21.9 CHRONIC GERD: Status: ACTIVE | Noted: 2021-06-13

## 2021-07-21 RX ORDER — ISOSORBIDE MONONITRATE 30 MG/1
30 TABLET, EXTENDED RELEASE ORAL DAILY
Qty: 90 | Refills: 3 | Status: DISCONTINUED | COMMUNITY
End: 2021-07-21

## 2021-07-21 NOTE — COUNSELING
[Normal Weight - ( BMI  <25 )] : normal weight - ( BMI  <25 ) [Continue diet as tolerated] : continue diet as tolerated based on goals of care [Non - Smoker] : non-smoker [Smoke/CO Detectors] : smoke/CO detectors [Use assistive device to avoid falls] : use assistive device to avoid falls [] : foot exam [Completed] : Aspirin use discussion completed [Decrease stress] : decrease stress [Decrease hospital use] : decrease hospital use [Minimize unnecessary interventions] : minimize unnecessary interventions [Maintain functional ability] : maintain functional ability [Likely to achieve goals/desired outcomes] : likely to achieve goals/desired outcomes [Advanced Directives discussed: ____] : Advanced directives discussed: [unfilled] [Completed Medical Orders for Life-Sustaining Treatment] : completed medical orders for life-sustaining treatment [Full Code] : Code Status: Full Code [No Limitations] : Treatment Guidelines: No limitations [Trial of Intubation] : Intubation: Trial of Intubation [[facility] Verification Date: _____] : [facility] Completion/last verification date: [unfilled] [Established patient, extensive review of history, medical and functional status, risk factors and patient education] : Established patient, extensive review of history, medical and functional status, risk factors and patient education and counseling provided for subsequent annual wellness visit

## 2021-07-21 NOTE — CHRONIC CARE ASSESSMENT
[PPS Score: ____] : Palliative Performance Scale (PPS) Score: [unfilled] [de-identified] : n/a [de-identified] : adequate

## 2021-07-21 NOTE — HISTORY OF PRESENT ILLNESS
[Patient] : patient [Family Member] : family member [FreeTextEntry1] : CAD, debiilty [FreeTextEntry2] : Patient denies fever, cough, trouble breathing, rash and vomiting. Patient has not been in close contact with someone who is COVID positive. N95 mask, gloves and eye wear worn during visit: Y \par Total face to face time with patient: 120 minutes. \par \par 89 yo F w/ PMHx right eye blindness (s/p cataract surgery, cornea transplant didn’t work), CHF (TTE, 6/2021: mild LVH, EF 55%, severe biatiral dilation, mild-mod AR, moderate MR, severe TR), afib s/p [facility] PPM (10 years ago), CAD s/p LAD stent (2019, 2020), moderate to severe TR, very calcified mitral valve, moderate pulmonary hypertension, diastolic CHF (TTE, 6/2021), severe TR, PVD, DM2 (on metformin), HTN, dementia, knee arthritis (son gives cortisone shots about twice annually), anxiety, compression fracture s/p fall from bed, \par \par Recent Hospitalization: Admitted w/ CHF exacerbation: IMproved w/ IV lasix diuresis. Started on iron and spironolactone. Started Lasix PO, and stopped isosorbide .\par \par DM2: on metformin, Checks daily BG. BG range 162\par Pain due to compression fracture much improved: only taking Tylenol PRN \par \par Appetite/dysphagia/weight: Intact. no dysphagia. \par Regular BMs. \par Urine: Sometimes urinates night, wears diapers at night in case of incontinence \par Sleep: Not great. Lifelong issue. \par Mood:Depression per son. \par Memory: Mild forgetfulness \par Skin: No issues  \par DME: Has walker/cane, but doesn't need to use them; Will get chairlift \par Receiving home PT \par \par Has not taken PNA shot. \par \par EP physician: Leesa \par Tram Conner ([facility]): pulm\par \par Son is an orthopedic surgeon \par Other son tis Anesthesiologist. \par Private help 24/7\par Has HCP: Anuel (son) \par \par Has two sons and 4 grandchildren. \par Worked in 's office. \par Irish [employer]ism is important to patient.

## 2021-07-21 NOTE — PHYSICAL EXAM
[No Acute Distress] : no acute distress [Well Nourished] : well nourished [Well Developed] : well developed [Normal Sclera/Conjunctiva] : normal sclera/conjunctiva [EOMI] : extra ocular movement intact [Normal Oropharynx] : the oropharynx was normal [Supple] : the neck was supple [No Respiratory Distress] : no respiratory distress [Clear to Auscultation] : lungs were clear to auscultation bilaterally [No Accessory Muscle Use] : no accessory muscle use [Normal Rate] : heart rate was normal  [Regular Rhythm] : with a regular rhythm [Normal S1, S2] : normal S1 and S2 [No Edema] : there was no peripheral edema [Non Tender] : non-tender [Soft] : abdomen soft [Not Distended] : not distended [No Joint Swelling] : no joint swelling seen [No Rash] : no rash [No Skin Lesions] : no skin lesions [Cranial Nerves Intact] : cranial nerves 2-12 were intact [No Motor Deficits] : the motor exam was normal [No Gross Sensory Deficits] : no gross sensory deficits [Normal Affect] : the affect was normal [Normal Mood] : the mood was normal [de-identified] : Right eye blindness

## 2021-07-21 NOTE — REASON FOR VISIT
[Family Member] : family member [Pre-Visit Preparation] : pre-visit preparation was done [Intercurrent Specialty/Sub-specialty Visits] : the patient has intercurrent specialty/sub-specialty visits [Initial Annual Medicare Wellness Visit] : an initial annual Medicare wellness visit [FreeTextEntry1] : CAD, afib

## 2021-09-21 ENCOUNTER — NON-APPOINTMENT (OUTPATIENT)
Age: 86
End: 2021-09-21

## 2021-09-24 ENCOUNTER — APPOINTMENT (OUTPATIENT)
Dept: HOME HEALTH SERVICES | Facility: HOME HEALTH | Age: 86
End: 2021-09-24
Payer: MEDICARE

## 2021-09-24 ENCOUNTER — MED ADMIN CHARGE (OUTPATIENT)
Age: 86
End: 2021-09-24

## 2021-09-24 VITALS
OXYGEN SATURATION: 97 % | SYSTOLIC BLOOD PRESSURE: 128 MMHG | HEART RATE: 90 BPM | DIASTOLIC BLOOD PRESSURE: 79 MMHG | TEMPERATURE: 98.1 F | RESPIRATION RATE: 16 BRPM

## 2021-09-24 DIAGNOSIS — R79.89 OTHER SPECIFIED ABNORMAL FINDINGS OF BLOOD CHEMISTRY: ICD-10-CM

## 2021-09-24 DIAGNOSIS — F51.04 PSYCHOPHYSIOLOGIC INSOMNIA: ICD-10-CM

## 2021-09-24 DIAGNOSIS — Z23 ENCOUNTER FOR IMMUNIZATION: ICD-10-CM

## 2021-09-24 DIAGNOSIS — Z95.0 PRESENCE OF CARDIAC PACEMAKER: ICD-10-CM

## 2021-09-24 PROCEDURE — G0008: CPT

## 2021-09-24 PROCEDURE — 90662 IIV NO PRSV INCREASED AG IM: CPT

## 2021-09-24 PROCEDURE — 99349 HOME/RES VST EST MOD MDM 40: CPT | Mod: 25

## 2021-09-24 RX ORDER — SPIRONOLACTONE 25 MG/1
25 TABLET ORAL
Qty: 90 | Refills: 3 | Status: DISCONTINUED | COMMUNITY
Start: 2021-07-21 | End: 2021-09-24

## 2021-09-24 RX ORDER — FUROSEMIDE 20 MG/1
20 TABLET ORAL DAILY
Qty: 90 | Refills: 3 | Status: DISCONTINUED | COMMUNITY
Start: 2021-07-21 | End: 2021-09-24

## 2021-09-24 NOTE — COUNSELING
[Normal Weight - ( BMI  <25 )] : normal weight - ( BMI  <25 ) [Continue diet as tolerated] : continue diet as tolerated based on goals of care [Non - Smoker] : non-smoker [Smoke/CO Detectors] : smoke/CO detectors [Use assistive device to avoid falls] : use assistive device to avoid falls [] : foot exam [Completed] : Aspirin use discussion completed [Decrease stress] : decrease stress [Decrease hospital use] : decrease hospital use [Minimize unnecessary interventions] : minimize unnecessary interventions [Maintain functional ability] : maintain functional ability [Likely to achieve goals/desired outcomes] : likely to achieve goals/desired outcomes [Advanced Directives discussed: ____] : Advanced directives discussed: [unfilled] [Completed Medical Orders for Life-Sustaining Treatment] : completed medical orders for life-sustaining treatment [Full Code] : Code Status: Full Code [No Limitations] : Treatment Guidelines: No limitations [Trial of Intubation] : Intubation: Trial of Intubation [Last Verification Date: _____] : [facility] Completion/last verification date: [unfilled]

## 2021-09-24 NOTE — REASON FOR VISIT
[Family Member] : family member [Pre-Visit Preparation] : pre-visit preparation was done [Intercurrent Specialty/Sub-specialty Visits] : the patient has intercurrent specialty/sub-specialty visits [Follow-Up] : a follow-up visit [FreeTextEntry1] : CAD, afib

## 2021-09-24 NOTE — HISTORY OF PRESENT ILLNESS
[Patient] : patient [Family Member] : family member [FreeTextEntry2] : Patient denies fever, cough, trouble breathing, rash and vomiting. Patient has not been in close contact with someone who is COVID positive. N95 mask, gloves and eye wear worn during visit: Y \par Total face to face time with patient: 30 minutes. \par \par 89 yo F w/ PMHx right eye blindness (s/p cataract surgery, cornea transplant didn’t work), CHF (TTE, 6/2021: mild LVH, HFpEF EF 55% (TTE, 6/2021: severe biatrial dilation, mild-mod AR, moderate MR, severe TR, very calcified mitral valve, moderate pulmonary hypertensio ), afib s/p [facility] PPM (10 years ago), CAD s/p LAD stent (2019, 2020), PVD, DM2 (on metformin), HTN, dementia, knee arthritis (son gives cortisone shots about twice annually), anxiety, compression fracture s/p prior fall from bed\par \par Endorses chronic tiredness Will order annual labs. \par DM2: on metformin, Checks daily BG.\par Pain due to compression fracture much improved: only taking Tylenol PRN \par CHF: Patient was d/c'ed from hospital for CHF exacerbation in 6/2021. Was d/c'ed w/ Spironolactone 25 qd and lasix 20 qd, however pt not currently taking. Currently taking imdur 30 although this was d/c'ed during hospitalization. Pt is euvolemic. Dtr in law not sure if CHF exacerbation in 6/2021 2/2 salt diet or not. Pt now eating low Na diet. Dtr in law will schedule cards f/u. \par \par Appetite/dysphagia/weight: Intact. no dysphagia. +Chronic decreased taste. \par Regular BMs. \par Urine: Sometimes urinates night, wears diapers at night in case of incontinence \par Sleep: Not great. Lifelong issue. \par Mood:Depression per son. \par Memory: Mild forgetfulness \par Skin: dry skin \par DME: Has walker/cane, but doesn't need to use them; Will get chairlift \par Receiving home PT \par \par +occasional chronic cough. \par +occasional dizziness \par \par Denies SOB, CP, palpitations, NV, leg edema, dysuria, hematuria, \par \par also takes biotin, calcium citrat \par \par Has not taken PNA shot. \par \par EP physician: Leesa \par Tram Conner ([facility]): pulm\par \par Son is an orthopedic surgeon \par Other son tis Anesthesiologist. \par Private help 24/7\par Has HCP: Anuel (son) \par \par Has two sons and 4 grandchildren. \par Worked in 's office. \par B[place]an Che[employer]ism [employer]ab[employer]ism is important to patient.  [FreeTextEntry1] : CAD, debiilty

## 2021-09-24 NOTE — CHRONIC CARE ASSESSMENT
[PPS Score: ____] : Palliative Performance Scale (PPS) Score: [unfilled] [de-identified] : n/a [de-identified] : adequate

## 2021-09-24 NOTE — PHYSICAL EXAM
[No Acute Distress] : no acute distress [Well Nourished] : well nourished [Well Developed] : well developed [Normal Sclera/Conjunctiva] : normal sclera/conjunctiva [EOMI] : extra ocular movement intact [Normal Oropharynx] : the oropharynx was normal [Supple] : the neck was supple [No Respiratory Distress] : no respiratory distress [Clear to Auscultation] : lungs were clear to auscultation bilaterally [No Accessory Muscle Use] : no accessory muscle use [Normal Rate] : heart rate was normal  [Regular Rhythm] : with a regular rhythm [Normal S1, S2] : normal S1 and S2 [No Edema] : there was no peripheral edema [Non Tender] : non-tender [Soft] : abdomen soft [Not Distended] : not distended [No Joint Swelling] : no joint swelling seen [No Rash] : no rash [No Skin Lesions] : no skin lesions [Cranial Nerves Intact] : cranial nerves 2-12 were intact [No Motor Deficits] : the motor exam was normal [No Gross Sensory Deficits] : no gross sensory deficits [Normal Affect] : the affect was normal [Normal Mood] : the mood was normal [de-identified] : Right eye blindness

## 2021-10-01 ENCOUNTER — NON-APPOINTMENT (OUTPATIENT)
Age: 86
End: 2021-10-01

## 2021-10-01 LAB
25(OH)D3 SERPL-MCNC: 24.9 NG/ML
ALBUMIN SERPL ELPH-MCNC: 4.3 G/DL
ALP BLD-CCNC: 64 U/L
ALT SERPL-CCNC: 12 U/L
ANION GAP SERPL CALC-SCNC: 11 MMOL/L
AST SERPL-CCNC: 17 U/L
BASOPHILS # BLD AUTO: 0.06 K/UL
BASOPHILS NFR BLD AUTO: 0.8 %
BILIRUB SERPL-MCNC: 0.5 MG/DL
BUN SERPL-MCNC: 10 MG/DL
CALCIUM SERPL-MCNC: 9.5 MG/DL
CHLORIDE SERPL-SCNC: 103 MMOL/L
CHOLEST SERPL-MCNC: 102 MG/DL
CO2 SERPL-SCNC: 26 MMOL/L
CREAT SERPL-MCNC: 0.54 MG/DL
EOSINOPHIL # BLD AUTO: 0.14 K/UL
EOSINOPHIL NFR BLD AUTO: 1.9 %
ESTIMATED AVERAGE GLUCOSE: 131 MG/DL
FOLATE SERPL-MCNC: >20 NG/ML
GLUCOSE SERPL-MCNC: 101 MG/DL
HBA1C MFR BLD HPLC: 6.2 %
HCT VFR BLD CALC: 34.3 %
HDLC SERPL-MCNC: 68 MG/DL
HGB BLD-MCNC: 11 G/DL
IMM GRANULOCYTES NFR BLD AUTO: 0.3 %
LDLC SERPL CALC-MCNC: 23 MG/DL
LYMPHOCYTES # BLD AUTO: 1.46 K/UL
LYMPHOCYTES NFR BLD AUTO: 20.1 %
MAN DIFF?: NORMAL
MCHC RBC-ENTMCNC: 32.1 GM/DL
MCHC RBC-ENTMCNC: 32.2 PG
MCV RBC AUTO: 100.3 FL
MONOCYTES # BLD AUTO: 0.64 K/UL
MONOCYTES NFR BLD AUTO: 8.8 %
NEUTROPHILS # BLD AUTO: 4.93 K/UL
NEUTROPHILS NFR BLD AUTO: 68.1 %
NONHDLC SERPL-MCNC: 34 MG/DL
PLATELET # BLD AUTO: 162 K/UL
POTASSIUM SERPL-SCNC: 4.8 MMOL/L
PROT SERPL-MCNC: 6.3 G/DL
RBC # BLD: 3.42 M/UL
RBC # FLD: 13.9 %
SODIUM SERPL-SCNC: 141 MMOL/L
TRIGL SERPL-MCNC: 58 MG/DL
TSH SERPL-ACNC: 1.7 UIU/ML
VIT B12 SERPL-MCNC: 260 PG/ML
WBC # FLD AUTO: 7.25 K/UL

## 2021-11-01 ENCOUNTER — TRANSCRIPTION ENCOUNTER (OUTPATIENT)
Age: 86
End: 2021-11-01

## 2021-11-02 ENCOUNTER — APPOINTMENT (OUTPATIENT)
Dept: HOME HEALTH SERVICES | Facility: HOME HEALTH | Age: 86
End: 2021-11-02

## 2021-11-02 VITALS — TEMPERATURE: 98 F | HEART RATE: 88 BPM | RESPIRATION RATE: 16 BRPM | OXYGEN SATURATION: 98 %

## 2021-11-02 RX ORDER — CA/D3/MAG OX/ZINC/COP/MANG/BOR 600 MG-800
250 MCG TABLET,CHEWABLE ORAL
Qty: 90 | Refills: 3 | Status: COMPLETED | COMMUNITY
Start: 2021-06-13 | End: 2021-11-02

## 2021-11-03 ENCOUNTER — NON-APPOINTMENT (OUTPATIENT)
Age: 86
End: 2021-11-03

## 2021-11-03 DIAGNOSIS — R14.3 FLATULENCE: ICD-10-CM

## 2021-11-05 ENCOUNTER — NON-APPOINTMENT (OUTPATIENT)
Age: 86
End: 2021-11-05

## 2021-11-05 LAB
BASOPHILS # BLD AUTO: 0.06 K/UL
BASOPHILS NFR BLD AUTO: 0.8 %
EOSINOPHIL # BLD AUTO: 0.08 K/UL
EOSINOPHIL NFR BLD AUTO: 1 %
FERRITIN SERPL-MCNC: 84 NG/ML
HCT VFR BLD CALC: 35.3 %
HGB BLD-MCNC: 10.8 G/DL
IMM GRANULOCYTES NFR BLD AUTO: 0.5 %
LYMPHOCYTES # BLD AUTO: 1.11 K/UL
LYMPHOCYTES NFR BLD AUTO: 14.5 %
MAN DIFF?: NORMAL
MCHC RBC-ENTMCNC: 30.6 GM/DL
MCHC RBC-ENTMCNC: 31.9 PG
MCV RBC AUTO: 104.1 FL
MONOCYTES # BLD AUTO: 0.75 K/UL
MONOCYTES NFR BLD AUTO: 9.8 %
NEUTROPHILS # BLD AUTO: 5.62 K/UL
NEUTROPHILS NFR BLD AUTO: 73.4 %
PLATELET # BLD AUTO: 176 K/UL
RBC # BLD: 3.39 M/UL
RBC # BLD: 3.39 M/UL
RBC # FLD: 14 %
RETICS # AUTO: 2.8 %
RETICS AGGREG/RBC NFR: 95.6 K/UL
TRANSFERRIN SERPL-MCNC: 258 MG/DL
WBC # FLD AUTO: 7.66 K/UL

## 2021-11-07 LAB
IRON SATN MFR SERPL: 46 %
IRON SERPL-MCNC: 147 UG/DL
TIBC SERPL-MCNC: 322 UG/DL
UIBC SERPL-MCNC: 174 UG/DL

## 2021-11-24 ENCOUNTER — NON-APPOINTMENT (OUTPATIENT)
Age: 86
End: 2021-11-24

## 2021-11-29 ENCOUNTER — APPOINTMENT (OUTPATIENT)
Dept: HOME HEALTH SERVICES | Facility: HOME HEALTH | Age: 86
End: 2021-11-29

## 2021-12-09 ENCOUNTER — NON-APPOINTMENT (OUTPATIENT)
Age: 86
End: 2021-12-09

## 2021-12-15 ENCOUNTER — APPOINTMENT (OUTPATIENT)
Dept: HOME HEALTH SERVICES | Facility: HOME HEALTH | Age: 86
End: 2021-12-15
Payer: MEDICARE

## 2021-12-15 VITALS
TEMPERATURE: 97.7 F | OXYGEN SATURATION: 93 % | HEART RATE: 94 BPM | SYSTOLIC BLOOD PRESSURE: 118 MMHG | DIASTOLIC BLOOD PRESSURE: 73 MMHG | RESPIRATION RATE: 16 BRPM

## 2021-12-15 PROCEDURE — 0064A: CPT

## 2021-12-23 ENCOUNTER — NON-APPOINTMENT (OUTPATIENT)
Age: 86
End: 2021-12-23

## 2021-12-24 ENCOUNTER — NON-APPOINTMENT (OUTPATIENT)
Age: 86
End: 2021-12-24

## 2021-12-27 ENCOUNTER — NON-APPOINTMENT (OUTPATIENT)
Age: 86
End: 2021-12-27

## 2021-12-30 ENCOUNTER — LABORATORY RESULT (OUTPATIENT)
Age: 86
End: 2021-12-30

## 2022-01-01 ENCOUNTER — APPOINTMENT (OUTPATIENT)
Dept: HOME HEALTH SERVICES | Facility: HOME HEALTH | Age: 87
End: 2022-01-01

## 2022-01-01 ENCOUNTER — NON-APPOINTMENT (OUTPATIENT)
Age: 87
End: 2022-01-01

## 2022-01-01 ENCOUNTER — TRANSCRIPTION ENCOUNTER (OUTPATIENT)
Age: 87
End: 2022-01-01

## 2022-01-01 ENCOUNTER — APPOINTMENT (OUTPATIENT)
Dept: HOME HEALTH SERVICES | Facility: HOME HEALTH | Age: 87
End: 2022-01-01
Payer: MEDICARE

## 2022-01-01 ENCOUNTER — LABORATORY RESULT (OUTPATIENT)
Age: 87
End: 2022-01-01

## 2022-01-01 ENCOUNTER — FORM ENCOUNTER (OUTPATIENT)
Age: 87
End: 2022-01-01

## 2022-01-01 ENCOUNTER — APPOINTMENT (OUTPATIENT)
Dept: HEART AND VASCULAR | Facility: CLINIC | Age: 87
End: 2022-01-01

## 2022-01-01 VITALS — HEART RATE: 90 BPM | OXYGEN SATURATION: 94 %

## 2022-01-01 VITALS — SYSTOLIC BLOOD PRESSURE: 98 MMHG | DIASTOLIC BLOOD PRESSURE: 54 MMHG

## 2022-01-01 VITALS — DIASTOLIC BLOOD PRESSURE: 76 MMHG | SYSTOLIC BLOOD PRESSURE: 128 MMHG

## 2022-01-01 DIAGNOSIS — Z71.89 OTHER SPECIFIED COUNSELING: ICD-10-CM

## 2022-01-01 DIAGNOSIS — E55.9 VITAMIN D DEFICIENCY, UNSPECIFIED: ICD-10-CM

## 2022-01-01 DIAGNOSIS — D64.9 ANEMIA, UNSPECIFIED: ICD-10-CM

## 2022-01-01 LAB
25(OH)D3 SERPL-MCNC: 19.3 NG/ML
ANION GAP SERPL CALC-SCNC: 13 MMOL/L
BASOPHILS # BLD AUTO: 0.03 K/UL
BASOPHILS NFR BLD AUTO: 0.5 %
BUN SERPL-MCNC: 11 MG/DL
CALCIUM SERPL-MCNC: 9.1 MG/DL
CHLORIDE SERPL-SCNC: 102 MMOL/L
CO2 SERPL-SCNC: 26 MMOL/L
CREAT SERPL-MCNC: 0.54 MG/DL
EGFR: 87 ML/MIN/1.73M2
EOSINOPHIL # BLD AUTO: 0.11 K/UL
EOSINOPHIL NFR BLD AUTO: 1.9 %
ESTIMATED AVERAGE GLUCOSE: 123 MG/DL
GLUCOSE SERPL-MCNC: 161 MG/DL
HBA1C MFR BLD HPLC: 5.9 %
HCT VFR BLD CALC: 35.6 %
HGB BLD-MCNC: 11 G/DL
IMM GRANULOCYTES NFR BLD AUTO: 0.5 %
LYMPHOCYTES # BLD AUTO: 0.86 K/UL
LYMPHOCYTES NFR BLD AUTO: 15 %
MAN DIFF?: NORMAL
MCHC RBC-ENTMCNC: 30.9 GM/DL
MCHC RBC-ENTMCNC: 33 PG
MCV RBC AUTO: 106.9 FL
MONOCYTES # BLD AUTO: 0.55 K/UL
MONOCYTES NFR BLD AUTO: 9.6 %
NEUTROPHILS # BLD AUTO: 4.14 K/UL
NEUTROPHILS NFR BLD AUTO: 72.5 %
PLATELET # BLD AUTO: 115 K/UL
POTASSIUM SERPL-SCNC: 4.7 MMOL/L
RBC # BLD: 3.33 M/UL
RBC # FLD: 14.2 %
SODIUM SERPL-SCNC: 141 MMOL/L
TSH SERPL-ACNC: 4.63 UIU/ML
WBC # FLD AUTO: 5.72 K/UL

## 2022-01-01 PROCEDURE — 99348 HOME/RES VST EST LOW MDM 30: CPT

## 2022-01-01 PROCEDURE — 99348 HOME/RES VST EST LOW MDM 30: CPT | Mod: 95

## 2022-01-01 PROCEDURE — 99349 HOME/RES VST EST MOD MDM 40: CPT | Mod: 95

## 2022-01-01 RX ORDER — ERGOCALCIFEROL 1.25 MG/1
1.25 MG CAPSULE ORAL
Qty: 12 | Refills: 3 | Status: ACTIVE | COMMUNITY
Start: 2022-01-01 | End: 1900-01-01

## 2022-01-01 RX ORDER — ISOSORBIDE MONONITRATE 30 MG/1
30 TABLET, EXTENDED RELEASE ORAL
Qty: 90 | Refills: 3 | Status: COMPLETED | COMMUNITY
Start: 2021-09-24 | End: 2022-01-01

## 2022-01-01 RX ORDER — SIMETHICONE 125 MG/1
125 TABLET, CHEWABLE ORAL
Qty: 1 | Refills: 3 | Status: COMPLETED | COMMUNITY
Start: 2021-11-03 | End: 2022-01-01

## 2022-01-01 RX ORDER — CETIRIZINE HYDROCHLORIDE 10 MG/1
10 TABLET, FILM COATED ORAL DAILY
Qty: 90 | Refills: 3 | Status: ACTIVE | COMMUNITY
Start: 2022-01-01 | End: 1900-01-01

## 2022-01-01 RX ORDER — GLUCOSAMINE HCL/CHONDROITIN SU 500-400 MG
3 CAPSULE ORAL
Qty: 100 | Refills: 3 | Status: COMPLETED | COMMUNITY
Start: 2021-09-24 | End: 2022-01-01

## 2022-01-01 RX ORDER — APIXABAN 5 MG/1
5 TABLET, FILM COATED ORAL
Qty: 180 | Refills: 3 | Status: ACTIVE | COMMUNITY
Start: 1900-01-01 | End: 1900-01-01

## 2022-01-01 RX ORDER — FUROSEMIDE 40 MG/1
40 TABLET ORAL DAILY
Qty: 90 | Refills: 3 | Status: ACTIVE | COMMUNITY
Start: 2022-01-01 | End: 1900-01-01

## 2022-01-01 RX ORDER — CHLORHEXIDINE GLUCONATE 4 %
325 (65 FE) LIQUID (ML) TOPICAL DAILY
Qty: 90 | Refills: 0 | Status: DISCONTINUED | COMMUNITY
Start: 2022-02-11 | End: 2022-01-01

## 2022-01-01 RX ORDER — METFORMIN HYDROCHLORIDE 500 MG/1
500 TABLET, COATED ORAL TWICE DAILY
Qty: 180 | Refills: 3 | Status: ACTIVE | COMMUNITY
Start: 2021-09-24 | End: 1900-01-01

## 2022-01-01 RX ORDER — ATORVASTATIN CALCIUM 40 MG/1
40 TABLET, FILM COATED ORAL DAILY
Qty: 90 | Refills: 3 | Status: ACTIVE | COMMUNITY
Start: 1900-01-01 | End: 1900-01-01

## 2022-01-01 RX ORDER — MAGNESIUM HYDROXIDE 400 MG/5ML
1200 SUSPENSION, ORAL (FINAL DOSE FORM) ORAL
Qty: 1 | Refills: 0 | Status: COMPLETED | COMMUNITY
Start: 2021-09-24 | End: 2022-01-01

## 2022-01-01 RX ORDER — CLOTRIMAZOLE AND BETAMETHASONE DIPROPIONATE 10; .5 MG/G; MG/G
1-0.05 CREAM TOPICAL
Qty: 45 | Refills: 0 | Status: COMPLETED | COMMUNITY
Start: 2021-04-26 | End: 2022-01-01

## 2022-01-01 RX ORDER — CHLORHEXIDINE GLUCONATE 4 %
325 (65 FE) LIQUID (ML) TOPICAL
Qty: 90 | Refills: 3 | Status: ACTIVE | COMMUNITY
Start: 2021-07-21 | End: 1900-01-01

## 2022-01-01 RX ORDER — LORATADINE 10 MG/1
10 TABLET ORAL
Qty: 90 | Refills: 3 | Status: COMPLETED | COMMUNITY
Start: 2021-06-13 | End: 2022-01-01

## 2022-01-01 RX ORDER — DAPAGLIFLOZIN 5 MG/1
5 TABLET, FILM COATED ORAL DAILY
Refills: 0 | Status: COMPLETED | COMMUNITY
Start: 2022-01-18 | End: 2022-01-01

## 2022-01-01 RX ORDER — FUROSEMIDE 20 MG/1
20 TABLET ORAL
Qty: 30 | Refills: 3 | Status: DISCONTINUED | COMMUNITY
Start: 2021-12-23 | End: 2022-01-01

## 2022-01-01 RX ORDER — SACUBITRIL AND VALSARTAN 24; 26 MG/1; MG/1
24-26 TABLET, FILM COATED ORAL TWICE DAILY
Qty: 180 | Refills: 3 | Status: ACTIVE | COMMUNITY
Start: 2022-01-01 | End: 1900-01-01

## 2022-01-02 LAB
ALBUMIN SERPL ELPH-MCNC: 4.2 G/DL
ALBUMIN SERPL ELPH-MCNC: 4.2 G/DL
ALP BLD-CCNC: 104 U/L
ALT SERPL-CCNC: 26 U/L
ANION GAP SERPL CALC-SCNC: 15 MMOL/L
ANION GAP SERPL CALC-SCNC: 15 MMOL/L
AST SERPL-CCNC: 34 U/L
BASOPHILS # BLD AUTO: 0.08 K/UL
BASOPHILS NFR BLD AUTO: 0.9 %
BILIRUB SERPL-MCNC: 0.8 MG/DL
BUN SERPL-MCNC: 14 MG/DL
BUN SERPL-MCNC: 14 MG/DL
CALCIUM SERPL-MCNC: 9 MG/DL
CALCIUM SERPL-MCNC: 9 MG/DL
CHLORIDE SERPL-SCNC: 100 MMOL/L
CHLORIDE SERPL-SCNC: 100 MMOL/L
CK SERPL-CCNC: 30 U/L
CO2 SERPL-SCNC: 25 MMOL/L
CO2 SERPL-SCNC: 25 MMOL/L
CREAT SERPL-MCNC: 0.59 MG/DL
CREAT SERPL-MCNC: 0.59 MG/DL
EOSINOPHIL # BLD AUTO: 0.15 K/UL
EOSINOPHIL NFR BLD AUTO: 1.7 %
ESTIMATED AVERAGE GLUCOSE: 137 MG/DL
GLUCOSE SERPL-MCNC: 78 MG/DL
HBA1C MFR BLD HPLC: 6.4 %
HCT VFR BLD CALC: 35.1 %
HGB BLD-MCNC: 10.4 G/DL
LYMPHOCYTES # BLD AUTO: 1.74 K/UL
LYMPHOCYTES NFR BLD AUTO: 20.2 %
MAN DIFF?: NORMAL
MCHC RBC-ENTMCNC: 29.6 GM/DL
MCHC RBC-ENTMCNC: 31.3 PG
MCV RBC AUTO: 105.7 FL
MONOCYTES # BLD AUTO: 0.76 K/UL
MONOCYTES NFR BLD AUTO: 8.8 %
MYOGLOBIN SERPL-MCNC: 22 NG/ML
NEUTROPHILS # BLD AUTO: 5.88 K/UL
NEUTROPHILS NFR BLD AUTO: 68.4 %
PHOSPHATE SERPL-MCNC: 3.1 MG/DL
PLATELET # BLD AUTO: 165 K/UL
POTASSIUM SERPL-SCNC: 4.7 MMOL/L
POTASSIUM SERPL-SCNC: 4.7 MMOL/L
PREALB SERPL NEPH-MCNC: 10 MG/DL
PROT SERPL-MCNC: 6.5 G/DL
RBC # BLD: 3.32 M/UL
RBC # FLD: 15.9 %
SODIUM SERPL-SCNC: 141 MMOL/L
SODIUM SERPL-SCNC: 141 MMOL/L
TROPONIN I SERPL-MCNC: 0.01 NG/ML
TSH SERPL-ACNC: 2.81 UIU/ML
WBC # FLD AUTO: 8.59 K/UL

## 2022-01-03 ENCOUNTER — NON-APPOINTMENT (OUTPATIENT)
Age: 87
End: 2022-01-03

## 2022-01-04 ENCOUNTER — NON-APPOINTMENT (OUTPATIENT)
Age: 87
End: 2022-01-04

## 2022-01-06 ENCOUNTER — APPOINTMENT (OUTPATIENT)
Dept: HEART AND VASCULAR | Facility: CLINIC | Age: 87
End: 2022-01-06
Payer: MEDICARE

## 2022-01-06 ENCOUNTER — NON-APPOINTMENT (OUTPATIENT)
Age: 87
End: 2022-01-06

## 2022-01-06 ENCOUNTER — APPOINTMENT (OUTPATIENT)
Dept: HOME HEALTH SERVICES | Facility: HOME HEALTH | Age: 87
End: 2022-01-06
Payer: MEDICARE

## 2022-01-06 DIAGNOSIS — E78.5 HYPERLIPIDEMIA, UNSPECIFIED: ICD-10-CM

## 2022-01-06 DIAGNOSIS — R07.89 OTHER CHEST PAIN: ICD-10-CM

## 2022-01-06 DIAGNOSIS — I25.10 ATHEROSCLEROTIC HEART DISEASE OF NATIVE CORONARY ARTERY W/OUT ANGINA PECTORIS: ICD-10-CM

## 2022-01-06 PROCEDURE — 99349 HOME/RES VST EST MOD MDM 40: CPT

## 2022-01-06 PROCEDURE — ZZZZZ: CPT

## 2022-01-06 PROCEDURE — 99214 OFFICE O/P EST MOD 30 MIN: CPT

## 2022-01-06 RX ORDER — PANTOPRAZOLE SODIUM 40 MG/1
40 TABLET, DELAYED RELEASE ORAL DAILY
Qty: 90 | Refills: 3 | Status: DISCONTINUED | COMMUNITY
End: 2022-01-06

## 2022-01-06 NOTE — COUNSELING
[Normal Weight - ( BMI  <25 )] : normal weight - ( BMI  <25 ) [Continue diet as tolerated] : continue diet as tolerated based on goals of care [Non - Smoker] : non-smoker [Smoke/CO Detectors] : smoke/CO detectors [Use assistive device to avoid falls] : use assistive device to avoid falls [] : foot exam [Completed] : Aspirin use discussion completed [Decrease stress] : decrease stress [Decrease hospital use] : decrease hospital use [Minimize unnecessary interventions] : minimize unnecessary interventions [Maintain functional ability] : maintain functional ability [Likely to achieve goals/desired outcomes] : likely to achieve goals/desired outcomes [Advanced Directives discussed: ____] : Advanced directives discussed: [unfilled] [Completed Medical Orders for Life-Sustaining Treatment] : completed medical orders for life-sustaining treatment [Full Code] : Code Status: Full Code [No Limitations] : Treatment Guidelines: No limitations [Trial of Intubation] : Intubation: Trial of Intubation [Last Verification Date: _____] : [facility] Completion/last verification date: [unfilled]

## 2022-01-06 NOTE — HISTORY OF PRESENT ILLNESS
[Home] : at home, [unfilled] , at the time of the visit. [Medical Office: ([facility])___] : at the medical office located in  [Other:____] : [unfilled] [Verbal consent obtained from patient] : the patient, [unfilled] [FreeTextEntry1] : Patient is a 89 year old female with a past medical history of chronic afib s/p pacemaker (10 years ago), diet controlled DM, HTN, severe pulmonary HTN, mod-severe TR, CAD (PCI to LAD 9/19) who presents for follow-up. \par \par Ms. RODRÍGUEZ has been at home since a PNA and admission to [facility] last year. She has recovered well and is becoming more active. SHe gets home visits now and was even doing 9 flights of stairs daily for exercise in recent past. \par \par Last week (Milo), her DIL called me to notify me that she was more short of breath and had pedal edema. She was placed on lasix 40 mg day one and then 20 daily for several days and is now off entirely and feeling well overall. \par \par She reports that she feels her heart go fast when she exerts herself, but not at rest. She has had a left chest discomfort in the past week on lying down believed to be from her PPM. \par \par She has noted no dyspnea in the past week even though she was notably short of breath at rest when she was originally seen last week. \par \par CXR- during exacerbation last week reportedly with mild edema at most\par Echo- in past year with preserved EF. \par \par 12/30/21- A1C 6.4 \par 9/2021- LDL 23 \par LDL- 58, non-LDL 76, HDL 82, total 158, trig 106

## 2022-01-06 NOTE — DISCUSSION/SUMMARY
[FreeTextEntry1] : Patient is a 89 year old female with a past medical history of chronic afib s/p pacemaker (10 years ago), diet controlled DM, HTN, severe pulmonary HTN, mod-severe TR, CAD (PCI to LAD 9/19) who presents for follow-up. \par \par CAD- Stable on plavix, metoprolol and atorvastatin with a very low LDL\par \par HTN- Well-controlled on dilt and metoprolol\par \par Dyspnea- likely in the setting of HFpEF and pulmonary HTN. SHe will weight herself several times weekly and use lasix several times weekly as needed. Will obtain last echo from [facility] as well. \par \par DM- stable on metformin. Will consider SGLT2 on next visit for HFpEF and to keep her euvolemic. \par \par Afib- Continue apixiban. Will discuss eliminating clopidogrel.

## 2022-01-10 VITALS
DIASTOLIC BLOOD PRESSURE: 74 MMHG | HEART RATE: 94 BPM | SYSTOLIC BLOOD PRESSURE: 123 MMHG | OXYGEN SATURATION: 95 % | RESPIRATION RATE: 16 BRPM | TEMPERATURE: 97.1 F

## 2022-01-10 PROBLEM — R07.89 CHEST TIGHTNESS: Status: RESOLVED | Noted: 2021-06-14 | Resolved: 2022-01-10

## 2022-01-10 NOTE — CHRONIC CARE ASSESSMENT
[PPS Score: ____] : Palliative Performance Scale (PPS) Score: [unfilled] [de-identified] : n/a [de-identified] : adequate

## 2022-01-10 NOTE — HISTORY OF PRESENT ILLNESS
[Patient] : patient [Family Member] : family member [FreeTextEntry1] : CAD, debiilty [FreeTextEntry2] : Patient denies fever, cough, trouble breathing, rash and vomiting. Patient has not been in close contact with someone who is COVID positive. N95 mask, gloves and eye wear worn during visit: Y \par Total face to face time with patient: 30 minutes. \par \par 89 yo F w/ PMHx right eye blindness (s/p cataract surgery, cornea transplant didn’t work), CHF (TTE, 6/2021: mild LVH, HFpEF EF 55% (TTE, 6/2021: severe biatrial dilation, mild-mod AR, moderate MR, severe TR, very calcified mitral valve, moderate pulmonary hypertensio ), afib s/p [facility] PPM (10 years ago), CAD s/p LAD stent (2019, 2020), PVD, DM2 (on metformin), HTN, dementia, knee arthritis (son gives cortisone shots about twice annually), anxiety, compression fracture s/p prior fall from bed\par \par DM2: on metformin,\par Pain due to compression fracture: well-controlled, only taking Tylenol PRN \par CHF: Had mild CHF exacerbation after Christmas holiday 2/2 increased sodium intake that resolved w/ low-dose Lasix.  Pt is euvolemic. Pt now eating low Na diet. Continus Lasix PRN. Now f/b cards (Natalia Braun) \par \par Stopped protonix as GERD no longer an issue. \par Appetite/dysphagia/weight: Intact. no dysphagia. +Chronic decreased taste. \par Regular BMs. \par Urine: Sometimes urinates night, wears diapers at night in case of incontinence \par Sleep: Not great. Lifelong issue. \par Mood:Depression per son. Not currently an issue. \par Memory: Mild forgetfulness \par Skin: dry skin \par DME: Has walker/cane, but doesn't need to use them; \par Receiving home PT \par \par +occasional chronic cough. \par +occasional dizziness \par \par Denies SOB, CP, palpitations, NV, leg edema, dysuria, hematuria, \par \par also takes biotin, calcium citrat \par \par Has not taken PNA shot. \par \par EP physician: Leesa \par Tram Conner ([facility]): pulm\par Cardiology: Renata Braun: [phone] ([facility]); work: [phone]\par \par Son is an orthopedic surgeon \par Other son tis Anesthesiologist. \par Private help 24/7\par Has HCP: Anuel (son) \par \par Has two sons and 4 grandchildren. \par Worked in 's office. \par [employer] is important to patient.

## 2022-01-10 NOTE — REASON FOR VISIT
[Follow-Up] : a follow-up visit [Family Member] : family member [Pre-Visit Preparation] : pre-visit preparation was done [Intercurrent Specialty/Sub-specialty Visits] : the patient has intercurrent specialty/sub-specialty visits [FreeTextEntry1] : CAD, afib

## 2022-01-10 NOTE — PHYSICAL EXAM
[No Acute Distress] : no acute distress [Well Nourished] : well nourished [Well Developed] : well developed [Normal Sclera/Conjunctiva] : normal sclera/conjunctiva [EOMI] : extra ocular movement intact [Normal Oropharynx] : the oropharynx was normal [Supple] : the neck was supple [No Respiratory Distress] : no respiratory distress [Clear to Auscultation] : lungs were clear to auscultation bilaterally [No Accessory Muscle Use] : no accessory muscle use [Normal Rate] : heart rate was normal  [Regular Rhythm] : with a regular rhythm [Normal S1, S2] : normal S1 and S2 [No Edema] : there was no peripheral edema [Non Tender] : non-tender [Soft] : abdomen soft [Not Distended] : not distended [No Joint Swelling] : no joint swelling seen [No Rash] : no rash [No Skin Lesions] : no skin lesions [Cranial Nerves Intact] : cranial nerves 2-12 were intact [No Motor Deficits] : the motor exam was normal [No Gross Sensory Deficits] : no gross sensory deficits [Normal Affect] : the affect was normal [Normal Mood] : the mood was normal [de-identified] : Right eye blindness

## 2022-01-20 RX ORDER — CLOPIDOGREL BISULFATE 75 MG/1
75 TABLET, FILM COATED ORAL DAILY
Qty: 90 | Refills: 3 | Status: DISCONTINUED | COMMUNITY
End: 2022-01-20

## 2022-04-05 ENCOUNTER — APPOINTMENT (OUTPATIENT)
Dept: HOME HEALTH SERVICES | Facility: HOME HEALTH | Age: 87
End: 2022-04-05
Payer: MEDICARE

## 2022-04-05 VITALS
DIASTOLIC BLOOD PRESSURE: 73 MMHG | RESPIRATION RATE: 16 BRPM | HEART RATE: 86 BPM | SYSTOLIC BLOOD PRESSURE: 105 MMHG | OXYGEN SATURATION: 98 %

## 2022-04-05 DIAGNOSIS — I10 ESSENTIAL (PRIMARY) HYPERTENSION: ICD-10-CM

## 2022-04-05 DIAGNOSIS — K44.9 DIAPHRAGMATIC HERNIA W/OUT OBSTRUCTION OR GANGRENE: ICD-10-CM

## 2022-04-05 PROCEDURE — 99349 HOME/RES VST EST MOD MDM 40: CPT

## 2022-04-05 RX ORDER — METFORMIN HYDROCHLORIDE 500 MG/1
500 TABLET, COATED ORAL TWICE DAILY
Qty: 180 | Refills: 3 | Status: DISCONTINUED | COMMUNITY
Start: 2021-06-13 | End: 2022-04-05

## 2022-04-05 RX ORDER — BRINZOLAMIDE 10 MG/ML
1 SUSPENSION/ DROPS OPHTHALMIC
Qty: 10 | Refills: 0 | Status: ACTIVE | COMMUNITY
Start: 2021-03-31

## 2022-04-05 RX ORDER — ACETAMINOPHEN 325 MG/1
325 TABLET, FILM COATED ORAL
Qty: 100 | Refills: 3 | Status: ACTIVE | COMMUNITY
Start: 2021-06-13

## 2022-04-05 NOTE — COUNSELING
[Normal Weight - ( BMI  <25 )] : normal weight - ( BMI  <25 ) [Continue diet as tolerated] : continue diet as tolerated based on goals of care [Non - Smoker] : non-smoker [Smoke/CO Detectors] : smoke/CO detectors [Use assistive device to avoid falls] : use assistive device to avoid falls [] : foot exam [Completed] : Aspirin use discussion completed [Decrease stress] : decrease stress [Decrease hospital use] : decrease hospital use [Minimize unnecessary interventions] : minimize unnecessary interventions [Maintain functional ability] : maintain functional ability [Likely to achieve goals/desired outcomes] : likely to achieve goals/desired outcomes [Advanced Directives discussed: ____] : Advanced directives discussed: [unfilled] [Completed Medical Orders for Life-Sustaining Treatment] : completed medical orders for life-sustaining treatment [Full Code] : Code Status: Full Code [No Limitations] : Treatment Guidelines: No limitations [Trial of Intubation] : Intubation: Trial of Intubation [[facility] Date: _____] [facility] Completion/last verification date: [unfilled]

## 2022-04-05 NOTE — HISTORY OF PRESENT ILLNESS
[Patient] : patient [Family Member] : family member [FreeTextEntry1] : CAD, debiilty [FreeTextEntry2] : Patient denies fever, cough, trouble breathing, rash and vomiting. Patient has not been in close contact with someone who is COVID positive. N95 mask, gloves and eye wear worn during visit: Y \par Total face to face time with patient: 30 minutes. \par \par 89 yo F w/ PMHx right eye blindness (s/p cataract surgery, cornea transplant didn’t work), CHF (TTE, 6/2021: mild LVH, HFpEF EF 55% (TTE, 6/2021: severe biatrial dilation, mild-mod AR, moderate MR, severe TR, very calcified mitral valve, moderate pulmonary hypertensio ), afib s/p [facility] PPM (10 years ago), CAD s/p LAD stent (2019, 2020), PVD, DM2 (on metformin), HTN, dementia, knee arthritis (son gives cortisone shots about twice annually), anxiety, compression fracture s/p prior fall from bed\par \par DM2: on metformin,\par Pain due to compression fracture: well-controlled, only taking Tylenol PRN \par CHF: Had mild CHF exacerbation after Christmas holiday 2/2 increased sodium intake that resolved w/ low-dose Lasix.  Pt is euvolemic. Pt now eating low Na diet. Continus Lasix PRN. Now f/b cards (Natalia Braun) \par \par Appetite/dysphagia/weight: Intact. no dysphagia. +Chronic decreased taste. \par Regular BMs. \par Urine: Sometimes urinates night, wears diapers at night in case of incontinence \par Sleep: Not great. Lifelong issue. \par Mood:Depression per son. Not currently an issue. \par Memory: Mild forgetfulness \par Skin: dry skin \par DME: Has walker/cane, but doesn't need to use them; \par Receiving home PT \par \par +occasional chronic cough. \par +occasional dizziness \par \par Denies SOB, CP, palpitations, NV, leg edema, dysuria, hematuria, \par \par also takes biotin, calcium citrat \par \par Has not taken PNA shot. \par \par EP physician: Leesa \par Tram Conner ([facility]): pulm\par Cardiology: Renata Braun: [phone] ([employer]); work: [phone]\par \par Son is an orthopedic surgeon \par Other son tis Anesthesiologist. \par Private help 24/7\par Has HCP: Anuel (son) \par \par Has two sons and 4 grandchildren. \par Worked in 's office. \par [employer] is important to patient.

## 2022-04-05 NOTE — PHYSICAL EXAM
[No Acute Distress] : no acute distress [Well Nourished] : well nourished [Well Developed] : well developed [Normal Sclera/Conjunctiva] : normal sclera/conjunctiva [EOMI] : extra ocular movement intact [Normal Oropharynx] : the oropharynx was normal [Supple] : the neck was supple [No Respiratory Distress] : no respiratory distress [Clear to Auscultation] : lungs were clear to auscultation bilaterally [No Accessory Muscle Use] : no accessory muscle use [Normal Rate] : heart rate was normal  [Regular Rhythm] : with a regular rhythm [Normal S1, S2] : normal S1 and S2 [No Edema] : there was no peripheral edema [Non Tender] : non-tender [Soft] : abdomen soft [Not Distended] : not distended [No Joint Swelling] : no joint swelling seen [No Rash] : no rash [No Skin Lesions] : no skin lesions [Cranial Nerves Intact] : cranial nerves 2-12 were intact [No Motor Deficits] : the motor exam was normal [No Gross Sensory Deficits] : no gross sensory deficits [Normal Affect] : the affect was normal [Normal Mood] : the mood was normal [de-identified] : Right eye blindness

## 2022-04-05 NOTE — CHRONIC CARE ASSESSMENT
[PPS Score: ____] : Palliative Performance Scale (PPS) Score: [unfilled] [de-identified] : n/a [de-identified] : adequate

## 2022-06-23 PROBLEM — D64.9 ANEMIA: Status: ACTIVE | Noted: 2021-07-21

## 2022-06-29 PROBLEM — E55.9 VITAMIN D DEFICIENCY: Status: ACTIVE | Noted: 2022-01-01

## 2022-08-01 NOTE — LETTER HEADER
- lipid panel, LDL 65.8, , HDL 32, h/o CABG changed to lipitor from simvastatin   - add fish oil for high TG    [Care Plan reviewed and provided to patient/caregiver] : Care plan reviewed and provided to patient/caregiver [Care Plan reviewed every ___ weeks] : Care plan reviewed every [unfilled] weeks [Care Plan managed/Care coordinated by: ___] : Care plan managed/Care coordinated by: [unfilled] [Patient/Caregiver agrees to have other providers send summary of their care to this office] : Patient/caregiver agrees to have other providers send summary of their care to this office

## 2022-08-01 NOTE — COUNSELING
[Normal Weight - ( BMI  <25 )] : normal weight - ( BMI  <25 ) [Continue diet as tolerated] : continue diet as tolerated based on goals of care [Non - Smoker] : non-smoker [Smoke/CO Detectors] : smoke/CO detectors [Use assistive device to avoid falls] : use assistive device to avoid falls [] : foot exam [Completed] : Aspirin use discussion completed [Decrease stress] : decrease stress [Decrease hospital use] : decrease hospital use [Minimize unnecessary interventions] : minimize unnecessary interventions [Maintain functional ability] : maintain functional ability [Likely to achieve goals/desired outcomes] : likely to achieve goals/desired outcomes [Advanced Directives discussed: ____] : Advanced directives discussed: [unfilled] [Completed Medical Orders for Life-Sustaining Treatment] : completed medical orders for life-sustaining treatment [Full Code] : Code Status: Full Code [No Limitations] : Treatment Guidelines: No limitations [Trial of Intubation] : Intubation: Trial of Intubation [Last [facility] Date: _____] [facility] Completion/last verification date: [unfilled]

## 2022-08-17 PROBLEM — Z71.89 ADVANCE CARE PLANNING: Status: RESOLVED | Noted: 2021-06-13 | Resolved: 2022-01-01

## 2022-08-25 NOTE — HISTORY OF PRESENT ILLNESS
[Patient] : patient [Family Member] : family member [FreeTextEntry1] : CAD, debiilty [FreeTextEntry2] : COVID SCREEN:\par Patient or caretaker denies fever, cough, trouble breathing, rash, vomiting. Patient has not been in close contact with anyone who is COVID-19 positive, or suspected of having COVID-19.\par \par N95 mask, gloves, eye wear and gown (if indicated) used during visit: Yes.\par \par Total face to face time with patient is 30 min.\par \par HPI:\par 89 yo F w/ PMHx right eye blindness (s/p cataract surgery, cornea transplant didn’t work), CHF (TTE, 6/2021: mild LVH, HFpEF EF 55% (TTE, 6/2021: severe biatrial dilation, mild-mod AR, moderate MR, severe TR, very calcified mitral valve, moderate pulmonary hypertensio ), afib s/p [facility] PPM (10 years ago), CAD s/p LAD stent (2019, 2020), PVD, DM2 (on metformin), HTN, dementia, knee arthritis (son gives cortisone shots about twice annually), anxiety, compression fracture s/p prior fall from bed. Seen today for routine followup visit and introduction to new [facility] PCP.\par \par Social/Home Environment:\par -From [place], [place] [employer], observant\par -Previously worked in 's office\par -Two sons (orthopedic surgeon & anesthesiologist), four grandchildren\par -Private help 24/7\par -HCP: Anuel (son) \par -EP physician: Dr. Landers \par -Pulmonologist: Dr. Tram Conner @ [facility]\par -Cardiology: Ruthneluis Higueras: [phone] ([employer]); work: [phone]\par \par Interval Events:\par -Friend/caretaker Paulino present\par -Reports feeling chronic weakness, since COVID resolved last year. But son rephrases that she's actually doing better than at that time.\par -T2DM, low A1C\par -Has certain foods that cause her feet to swell (eggs, cheese) -- received furosemide from cardiologist, takes PRN. Discussed nature of this swelling, likely sodium\par -Son gives low dose vit D after she had upset stomach with larger dose. However level is low. Advise transition to weekly supplement\par -Off vit D for 3 months, restarting now\par -Sat below baseline at 94%, LLL wheezing, pt with h/o asthma, but no chronic controlled meds. Son notes she travelled recently & her circadian rhythm is off, also had not taken meds this AM. Overall wants to watch & wait.\par -Defer 2nd booster at this time -- given recent symptoms, son doesn't want to make anything worse.\par -Pitting edema, taking furosemide, notes this is a chronic stable finding and waxes/wanes\par \par Subjective:\par 1. Appetite/Weight: Weight stable, appetite good.\par 2. Gait/Falls: No recent falls.\par 3. Sleep: Fair\par 4. BMs: Normal, no complaints\par 5. Urine: Normal, no complaints\par 6. Skin: No rash/ulcers\par 7. Mood/Memory: Superficially appropriate, did not assess orientation.\par  \par DME: None appreciated during visit.\par \par ADVANCE CARE PLANNING:\par Began discussion, friend & patient state they want to review MOLST with family & return it next visit.

## 2022-08-25 NOTE — ADDENDUM
[FreeTextEntry1] : NOTE: Writer conducted visit under impression interviewee Paulino was pt's son Anuel (likely a misunderstanding/mishearing when writer greeted Paulino). A later conversation with DIL reveals that Anuel was not involved and the gentleman speaking for patient was friend/caretaker named Paulino. Note corrected for clarity.

## 2022-08-25 NOTE — CHRONIC CARE ASSESSMENT
[PPS Score: ____] : Palliative Performance Scale (PPS) Score: [unfilled] [de-identified] : n/a [de-identified] : adequate

## 2022-08-25 NOTE — DATA REVIEWED
[FreeTextEntry1] : 4/2021\par Cr 0.4 \par Hgb 10.5 \par \par \par no longer on spirnolactone\par farxiga is PRN\par stopped lasix

## 2022-08-25 NOTE — PHYSICAL EXAM
[No Acute Distress] : no acute distress [Well Nourished] : well nourished [Well Developed] : well developed [Normal Sclera/Conjunctiva] : normal sclera/conjunctiva [EOMI] : extra ocular movement intact [Normal Oropharynx] : the oropharynx was normal [Supple] : the neck was supple [No Respiratory Distress] : no respiratory distress [No Accessory Muscle Use] : no accessory muscle use [Normal S1, S2] : normal S1 and S2 [Not Distended] : not distended [No Joint Swelling] : no joint swelling seen [No Rash] : no rash [No Skin Lesions] : no skin lesions [Cranial Nerves Intact] : cranial nerves 2-12 were intact [No Motor Deficits] : the motor exam was normal [No Gross Sensory Deficits] : no gross sensory deficits [Normal Affect] : the affect was normal [Normal Mood] : the mood was normal [Normal Rate] : heart rate was normal  [Regular Rhythm] : with a regular rhythm [No Murmurs] : no murmurs heard [Normal Bowel Sounds] : normal bowel sounds [Non Tender] : non-tender [Soft] : abdomen soft [de-identified] : Pleasant female seated on couch. [de-identified] : Right eye blindness [de-identified] : BL LE pedal edema at baseline

## 2022-08-25 NOTE — REASON FOR VISIT
[Follow-Up] : a follow-up visit [Formal Caregiver] : formal caregiver [Pre-Visit Preparation] : pre-visit preparation was done [Intercurrent Specialty/Sub-specialty Visits] : the patient has intercurrent specialty/sub-specialty visits [FreeTextEntry1] : CAD, afib

## 2022-08-29 NOTE — DISCHARGE NOTE NURSING/CASE MANAGEMENT/SOCIAL WORK - NURSING SECTION COMPLETE
Abdomen soft, non-tender and non-distended, no rebound, no guarding and no masses. no hepatosplenomegaly. Patient/Caregiver provided printed discharge information.

## 2022-10-06 NOTE — CHRONIC CARE ASSESSMENT
[PPS Score: ____] : Palliative Performance Scale (PPS) Score: [unfilled] [de-identified] : n/a [de-identified] : adequate

## 2022-10-06 NOTE — PHYSICAL EXAM
[No Acute Distress] : no acute distress [Well Nourished] : well nourished [Well Developed] : well developed [Normal Sclera/Conjunctiva] : normal sclera/conjunctiva [EOMI] : extra ocular movement intact [Normal Oropharynx] : the oropharynx was normal [Supple] : the neck was supple [No Respiratory Distress] : no respiratory distress [No Accessory Muscle Use] : no accessory muscle use [Normal Rate] : heart rate was normal  [Regular Rhythm] : with a regular rhythm [Normal S1, S2] : normal S1 and S2 [No Murmurs] : no murmurs heard [Normal Bowel Sounds] : normal bowel sounds [Non Tender] : non-tender [Soft] : abdomen soft [Not Distended] : not distended [No Joint Swelling] : no joint swelling seen [No Rash] : no rash [No Skin Lesions] : no skin lesions [Cranial Nerves Intact] : cranial nerves 2-12 were intact [No Motor Deficits] : the motor exam was normal [No Gross Sensory Deficits] : no gross sensory deficits [Normal Affect] : the affect was normal [Normal Mood] : the mood was normal [de-identified] : Pleasant female seated on couch. [de-identified] : Right eye blindness [de-identified] : BL LE pedal edema at baseline

## 2022-10-06 NOTE — HISTORY OF PRESENT ILLNESS
[Patient] : patient [Family Member] : family member [FreeTextEntry1] : CAD, debiilty [FreeTextEntry2] : FRANCIE HANNA is being seen for a visit provided via telehealth via [employer]. This visit was first attempted using [employer] real-time audio visual technology, but was unable to be completed. FRANCIE HANNA was located at their home, [address] [address], at the time of the visit. The [facility] clinician, DINORA GUARDADO, was located remotely at their home in [place] at the time of the visit. The patient, FRANCIE HANNA, and the [facility] clinician, DINORA GUARDADO, participated in the telehealth encounter. Other participants included: DIL\par \par FRANCIE HANNA (Oct 26 1930) or his/her representative consents to the use of telehealth. All questions related to telehealth answered.\par \par \par HPI:\par 89yo F w/ PMHx right eye blindness (s/p cataract surgery, cornea transplant didn’t work), CHF (TTE, 6/2021: mild LVH, HFpEF EF 55% (TTE, 6/2021: severe biatrial dilation, mild-mod AR, moderate MR, severe TR, very calcified mitral valve, moderate pulmonary hypertension), Afib s/p [facility] PPM (10 years ago), CAD s/p LAD stent (2019, 2020), PVD, DM2 (on metformin), HTN, dementia, knee arthritis (son gives cortisone shots about twice annually), anxiety, compression fracture s/p prior fall from bed. Seen today for routine followup visit and introduction to new [facility] PCP.\par \par Social/Home Environment:\par -From [place], [place] [employer], observant\par -Previously worked in 's office\par -Two sons (orthopedic surgeon & anesthesiologist), four grandchildren\par -Private help 24/7\par -HCP: Anuel (son) \par -EP physician: Dr. Landers \par -Pulmonologist: Dr. Tram Conner @ [facility]\par -Cardiology: Renata Braun: [phone] ([employer]); work: [phone]\par \par Interval Events:\par -Seen via telehealth with daughter-in-law Merari\par -Pt lost approx 10lbs water weight\par -Furosemide 40mg BID, Entresto\par -New CHF cardiologist in [place], Dr. Patrick\par -Isosorbide held\par -Sent new orders for meds 90 day supplies to local [facility]\par -Home Care PT, OT, nursing visiting.\par -Able to acquire brinzolamide 1% for 5 euros per bottle in [facility], so they have a chronic supply.\par -No vaccination\par -98/54 with OT, no dizziness/falls. Cutting back furosemide to 40mg daily.\par \par Subjective:\par 1. Appetite/Weight: Weight stable, appetite good.\par 2. Gait/Falls: No recent falls.\par 3. Sleep: Fair\par 4. BMs: Normal, no complaints\par 5. Urine: Normal, no complaints\par 6. Skin: No rash/ulcers\par 7. Mood/Memory: Superficially appropriate, did not assess orientation.\par  \par DME: None appreciated during visit.\par \par ADVANCE CARE PLANNING:\par -Deferred today -- default full code until formal discussion occurs.

## 2022-12-01 NOTE — HISTORY OF PRESENT ILLNESS
[FreeTextEntry1] : CAD, debiilty [FreeTextEntry2] : FRANCIE HANNA is being seen for a visit provided via telehealth via [employer]. This visit was first attempted using [employer] real-time audio visual technology, but was unable to be completed. FRANCIE HANNA was located at their home, [address] [address], at the time of the visit. The [facility] clinician, DINORA GUARDADO, was located remotely at their home in [place] at the time of the visit. The patient, FRANCIE HANNA, and the [facility] clinician, DINORA GUARDADO, participated in the telehealth encounter. Other participants included: []\par \par FRANCIE HANNA (Oct 26 1930) or his/her representative consents to the use of telehealth. All questions related to telehealth answered.\par \par HPI:\par 89yo F w/ PMHx right eye blindness (s/p cataract surgery, cornea transplant didn’t work), CHF (TTE, 6/2021: mild LVH, HFpEF EF 55% (TTE, 6/2021: severe biatrial dilation, mild-mod AR, moderate MR, severe TR, very calcified mitral valve, moderate pulmonary hypertension), Afib s/p [facility] PPM (10 years ago), CAD s/p LAD stent (2019, 2020), PVD, DM2 (on metformin), HTN, dementia, knee arthritis (son gives cortisone shots about twice annually), anxiety, compression fracture s/p prior fall from bed. Seen today for routine followup visit via telehealth.\par \par Social/Home Environment:\par -From [place], [place] [employer], observant\par -Previously worked in 's office\par -Two sons (orthopedic surgeon & anesthesiologist), four grandchildren\par -Private help 24/7\par -HCP: Anuel (son) \par -EP physician: Dr. Landers \par -Pulmonologist: Dr. Tram Conner @ [facility]\par -Cardiology: Renata Barun: [phone] ([employer]); work: [phone]\par \par Interval Events:\par -Seen via telehealth with daughter-in-law Merari\par -Doing well overall\par -Family takes her out to shop\par -New Lexapro 10mg daily\par -Unclear if she received Fluzone at [facility], knows a vaccine was given. Discussed consequences of doubling up flu vaccination which are not adverse. Recommend Prevnar 20 (PCV-20) given lack of past record of pneumococcal vaccine. Also interested in Pfizer bivalent. May schedule all 3 at pharmacy, which is OK.\par -Unfortunately continued tension with other branch of family. Patient anticipated to remain in [place] for time being.\par \par Subjective:\par 1. Appetite/Weight: Weight stable, appetite good.\par 2. Gait/Falls: No recent falls.\par 3. Sleep: Fair\par 4. BMs: Normal, no complaints\par 5. Urine: Normal, no complaints\par 6. Skin: No rash/ulcers\par 7. Mood/Memory: Superficially appropriate, did not assess orientation.\par  \par DME: None

## 2023-01-01 ENCOUNTER — APPOINTMENT (OUTPATIENT)
Dept: HEART AND VASCULAR | Facility: CLINIC | Age: 88
End: 2023-01-01

## 2023-01-01 ENCOUNTER — APPOINTMENT (OUTPATIENT)
Dept: HOME HEALTH SERVICES | Facility: HOME HEALTH | Age: 88
End: 2023-01-01
Payer: MEDICARE

## 2023-01-01 DIAGNOSIS — I48.91 UNSPECIFIED ATRIAL FIBRILLATION: ICD-10-CM

## 2023-01-01 DIAGNOSIS — I70.0 ATHEROSCLEROSIS OF AORTA: ICD-10-CM

## 2023-01-01 DIAGNOSIS — I27.20 PULMONARY HYPERTENSION, UNSPECIFIED: ICD-10-CM

## 2023-01-01 DIAGNOSIS — E11.9 TYPE 2 DIABETES MELLITUS W/OUT COMPLICATIONS: ICD-10-CM

## 2023-01-01 DIAGNOSIS — F03.90 UNSPECIFIED DEMENTIA W/OUT BEHAVIORAL DISTURBANCE: ICD-10-CM

## 2023-01-01 DIAGNOSIS — I50.9 HEART FAILURE, UNSPECIFIED: ICD-10-CM

## 2023-01-01 DIAGNOSIS — D69.6 THROMBOCYTOPENIA, UNSPECIFIED: ICD-10-CM

## 2023-01-01 PROCEDURE — G0506: CPT | Mod: 95

## 2023-01-01 PROCEDURE — G0439: CPT | Mod: 95

## 2023-01-01 RX ORDER — METOPROLOL SUCCINATE 50 MG/1
50 TABLET, EXTENDED RELEASE ORAL DAILY
Qty: 90 | Refills: 3 | Status: ACTIVE | COMMUNITY
Start: 2023-01-01

## 2023-01-01 RX ORDER — METOPROLOL SUCCINATE 25 MG/1
25 TABLET, EXTENDED RELEASE ORAL
Qty: 90 | Refills: 3 | Status: COMPLETED | COMMUNITY
End: 2023-01-01

## 2023-01-01 RX ORDER — SPIRONOLACTONE 25 MG/1
25 TABLET ORAL
Qty: 90 | Refills: 3 | Status: ACTIVE | COMMUNITY
Start: 2023-01-01

## 2023-01-01 RX ORDER — ESCITALOPRAM OXALATE 10 MG/1
10 TABLET ORAL
Qty: 135 | Refills: 3 | Status: ACTIVE | COMMUNITY
Start: 2022-01-01

## 2023-01-01 RX ORDER — DILTIAZEM HYDROCHLORIDE 120 MG/1
120 CAPSULE, EXTENDED RELEASE ORAL
Qty: 90 | Refills: 3 | Status: COMPLETED | COMMUNITY
Start: 2021-06-13 | End: 2023-01-01

## 2023-04-30 PROBLEM — I27.20 SEVERE PULMONARY HYPERTENSION: Status: ACTIVE | Noted: 2021-06-13

## 2023-04-30 PROBLEM — F03.90 DEMENTIA: Status: ACTIVE | Noted: 2017-02-08

## 2023-04-30 PROBLEM — I50.9 CHF (CONGESTIVE HEART FAILURE): Status: ACTIVE | Noted: 2021-07-21

## 2023-04-30 PROBLEM — D69.6 THROMBOCYTOPENIA: Status: ACTIVE | Noted: 2022-01-01

## 2023-04-30 PROBLEM — I70.0 AORTIC ATHEROSCLEROSIS: Status: ACTIVE | Noted: 2021-06-08

## 2023-04-30 NOTE — COUNSELING
[Normal Weight - ( BMI  <25 )] : normal weight - ( BMI  <25 ) [Continue diet as tolerated] : continue diet as tolerated based on goals of care [Non - Smoker] : non-smoker [Smoke/CO Detectors] : smoke/CO detectors [Use assistive device to avoid falls] : use assistive device to avoid falls [] : foot exam [Completed] : Aspirin use discussion completed [Decrease stress] : decrease stress [Decrease hospital use] : decrease hospital use [Minimize unnecessary interventions] : minimize unnecessary interventions [Maintain functional ability] : maintain functional ability [Likely to achieve goals/desired outcomes] : likely to achieve goals/desired outcomes [Advanced Directives discussed: ____] : Advanced directives discussed: [unfilled] [Completed Medical Orders for Life-Sustaining Treatment] : completed medical orders for life-sustaining treatment [Full Code] : Code Status: Full Code [No Limitations] : Treatment Guidelines: No limitations [Trial of Intubation] : Intubation: Trial of Intubation [[facility] Date: _____] [facility]pletion/last verification date: [unfilled] [_____] : HCP: [unfilled] [Established patient, extensive review of history, medical and functional status, risk factors and patient education] : Established patient, extensive review of history, medical and functional status, risk factors and patient education and counseling provided for subsequent annual wellness visit [ - New patient with 2 or more chronic conditions; CCM discussed and patient-centered care plan established] : New patient with 2 or more chronic conditions; CCM discussed and patient-centered care plan established

## 2023-04-30 NOTE — HISTORY OF PRESENT ILLNESS
[Patient] : patient [Family Member] : family member [FreeTextEntry1] : CAD, debiilty [FreeTextEntry2] : FRANCIE HANNA is being seen for a visit provided via telehealth via [employer]. This visit was first attempted using [employer] real-time audio visual technology, but was unable to be completed. FRANCIE HANNA was located at their home, [address] [address], at the time of the visit. The [facility] clinician, DINORA GUARDADO, was located remotely at their home in [place] at the time of the visit. The patient, FRANCIE HANNA, and the [facility] clinician, DINORA GUARDADO, participated in the telehealth encounter. Other participants included: jenniferr Merari\par \par FRANCIE HANNA (Oct 26 1930) or his/her representative consents to the use of telehealth. All questions related to telehealth answered.\par \par HPI:\par 89yo F w/ PMHx right eye blindness (s/p cataract surgery, cornea transplant didn’t work), CHF (TTE, 6/2021: mild LVH, HFpEF EF 55% (TTE, 6/2021: severe biatrial dilation, mild-mod AR, moderate MR, severe TR, very calcified mitral valve, moderate pulmonary hypertension), Afib s/p [facility] PPM (10 years ago), CAD s/p LAD stent (2019, 2020), PVD, DM2 (on metformin), HTN, dementia, knee arthritis (son gives cortisone shots about twice annually), anxiety, compression fracture s/p prior fall from bed. Seen today for routine followup visit via telehealth.\par \par Social/Home Environment:\par -From [place], [place], observant\par -Previously worked in 's office\par -Two sons (orthopedic surgeon & anesthesiologist), four grandchildren\par -Private help 24/7\par -HCP: Anuel (son) \par -EP physician: Dr. Landers \par -Pulmonologist: Dr. Tram Conner @ [facility]\par -Cardiology: Renata Braun: [phone] ([employer] work: [phone]\par \par Interval Events:\par -Seen via telehealth with daughter-in-law Merari\par -Hospitalized last month for CHF exacerbation & UTI\par -Saw CHF specialist, Dr. Patrick in [place]\par -Tentative new PCP but hasn't established yet\par -HFpEF: Entresto held, metoprolol up to succinate 50mg daily\par -pHTN: F/u cardiology\par -Depression: Escitalopram 15mg QHS\par \par Subjective:\par 1. Appetite/Weight: Weight stable, appetite good.\par 2. Gait/Falls: No recent falls.\par 3. Sleep: Fair\par 4. BMs: Normal, no complaints\par 5. Urine: Normal, no complaints\par 6. Skin: No rash/ulcers\par 7. Mood/Memory: Superficially appropriate, did not assess orientation.\par  \par DME: None\par \par ADVANCE CARE PLANNING:\par 1. Total Time Spent: 20 min\par 2. Participants: Myself, patient, DIL\par 3. Discussion: Goals of Care, Advanced Directives, Health Care Agency, and Disease trajectory\par 4. Disease Trajectory: Discussed and reviewed that patient's health is currently stable, and the prognosis moving forward is unclear.\par 5. Prognosis, Based On Clinician Best Judgment: Indeterminate\par 6. Goals of Care: 1) Extend life, by hospitalization and aggressive measures if needed, 2) Avoid discomfort, 3) Manage medical problems at home where safely possible.\par 7. HCA: renée Sandoval\par 8. MOLST Completed: CPR yes, intubation and trial ventilation, do hospitalize, no limitation on medical interventions, trial feeding tube, trial IVF, use antibiotics.\par 9. Hospice Benefit discussion deferred at this time.

## 2023-04-30 NOTE — PHYSICAL EXAM
[No Acute Distress] : no acute distress [Well Nourished] : well nourished [Well Developed] : well developed [Normal Sclera/Conjunctiva] : normal sclera/conjunctiva [EOMI] : extra ocular movement intact [Normal Oropharynx] : the oropharynx was normal [Supple] : the neck was supple [No Respiratory Distress] : no respiratory distress [No Accessory Muscle Use] : no accessory muscle use [No Murmurs] : no murmurs heard [Not Distended] : not distended [No Joint Swelling] : no joint swelling seen [No Rash] : no rash [No Skin Lesions] : no skin lesions [Cranial Nerves Intact] : cranial nerves 2-12 were intact [No Motor Deficits] : the motor exam was normal [No Gross Sensory Deficits] : no gross sensory deficits [Normal Affect] : the affect was normal [Normal Mood] : the mood was normal [de-identified] : Pleasant female seated in chair. [de-identified] : Right eye blindness

## 2023-04-30 NOTE — CHRONIC CARE ASSESSMENT
[PPS Score: ____] : Palliative Performance Scale (PPS) Score: [unfilled] [de-identified] : n/a [de-identified] : adequate

## 2023-04-30 NOTE — REASON FOR VISIT
[Formal Caregiver] : formal caregiver [Pre-Visit Preparation] : pre-visit preparation was done [Intercurrent Specialty/Sub-specialty Visits] : the patient has intercurrent specialty/sub-specialty visits [Subsequent Annual Medicare Wellness Visit] : a subsequent annual Medicare wellness visit [FreeTextEntry1] : CAD, afib [FreeTextEntry2] : chart review

## 2024-12-24 NOTE — PATIENT PROFILE ADULT - NSPROGENOTHERPROVIDER_GEN_A_NUR
Form(s) filled out and given to reception. Please notify parent that papers are available for  at their convenience.  Thank you.
none

## 2025-04-07 NOTE — PROVIDER CONTACT NOTE (CRITICAL VALUE NOTIFICATION) - NAME OF MD/NP/PA/DO NOTIFIED:
Collin Andrade Admission Reconciliation is Completed  Discharge Reconciliation is Not Complete Admission Reconciliation is Completed  Discharge Reconciliation is Completed